# Patient Record
Sex: MALE | Race: WHITE | NOT HISPANIC OR LATINO | Employment: FULL TIME | URBAN - METROPOLITAN AREA
[De-identification: names, ages, dates, MRNs, and addresses within clinical notes are randomized per-mention and may not be internally consistent; named-entity substitution may affect disease eponyms.]

---

## 2017-03-11 ENCOUNTER — OFFICE VISIT (OUTPATIENT)
Dept: OPHTHALMOLOGY | Facility: CLINIC | Age: 52
End: 2017-03-11
Payer: COMMERCIAL

## 2017-03-11 DIAGNOSIS — H52.7 REFRACTIVE ERROR: ICD-10-CM

## 2017-03-11 DIAGNOSIS — Z13.5 GLAUCOMA SCREENING: ICD-10-CM

## 2017-03-11 DIAGNOSIS — Z01.00 VISIT FOR EYE AND VISION EXAM: Primary | ICD-10-CM

## 2017-03-11 PROCEDURE — 92014 COMPRE OPH EXAM EST PT 1/>: CPT | Mod: S$GLB,,, | Performed by: OPTOMETRIST

## 2017-03-11 PROCEDURE — 92015 DETERMINE REFRACTIVE STATE: CPT | Mod: S$GLB,,, | Performed by: OPTOMETRIST

## 2017-03-11 PROCEDURE — 99999 PR PBB SHADOW E&M-EST. PATIENT-LVL I: CPT | Mod: PBBFAC,,, | Performed by: OPTOMETRIST

## 2017-03-11 RX ORDER — HYDROCODONE BITARTRATE AND ACETAMINOPHEN 5; 325 MG/1; MG/1
TABLET ORAL
Refills: 0 | COMMUNITY
Start: 2017-02-20 | End: 2017-03-11

## 2017-03-11 NOTE — PROGRESS NOTES
HPI     Eye Exam    Additional comments: yearly           Blurred Vision    Additional comments: near           Eye Strain    Additional comments: fatigue           Comments   Pt's last eye exam was 9/26/14 with trf. First time seeing [facility]. Uses   +1.50-+2.00 otc readers. Interested in trying progressive lenses. States   possible change in va. C/o eye fatigue. Not using any gtts.        Last edited by Myriam Rodriguez on 3/11/2017  8:52 AM. (History)            Assessment /Plan     For exam results, see Encounter Report.    Visit for eye and vision exam    Glaucoma screening    Refractive error      OH OK OU.  Spec (PALS) Rx given.  RTC one year.

## 2019-11-21 DIAGNOSIS — Z00.00 ROUTINE GENERAL MEDICAL EXAMINATION AT A HEALTH CARE FACILITY: Primary | ICD-10-CM

## 2019-11-26 ENCOUNTER — CLINICAL SUPPORT (OUTPATIENT)
Dept: PULMONOLOGY | Facility: CLINIC | Age: 54
End: 2019-11-26

## 2019-11-26 ENCOUNTER — CLINICAL SUPPORT (OUTPATIENT)
Dept: CARDIOLOGY | Facility: CLINIC | Age: 54
End: 2019-11-26

## 2019-11-26 ENCOUNTER — HOSPITAL ENCOUNTER (OUTPATIENT)
Dept: RADIOLOGY | Facility: HOSPITAL | Age: 54
Discharge: HOME | End: 2019-11-26
Attending: INTERNAL MEDICINE

## 2019-11-26 ENCOUNTER — CLINICAL SUPPORT (OUTPATIENT)
Dept: INTERNAL MEDICINE | Facility: CLINIC | Age: 54
End: 2019-11-26

## 2019-11-26 ENCOUNTER — CLINICAL SUPPORT (OUTPATIENT)
Dept: AUDIOLOGY | Facility: CLINIC | Age: 54
End: 2019-11-26

## 2019-11-26 ENCOUNTER — OFFICE VISIT (OUTPATIENT)
Dept: INTERNAL MEDICINE | Facility: CLINIC | Age: 54
End: 2019-11-26

## 2019-11-26 VITALS
HEART RATE: 74 BPM | HEIGHT: 69 IN | RESPIRATION RATE: 16 BRPM | DIASTOLIC BLOOD PRESSURE: 78 MMHG | WEIGHT: 187.38 LBS | TEMPERATURE: 97 F | BODY MASS INDEX: 27.75 KG/M2 | SYSTOLIC BLOOD PRESSURE: 120 MMHG

## 2019-11-26 DIAGNOSIS — Z00.00 ROUTINE GENERAL MEDICAL EXAMINATION AT A HEALTH CARE FACILITY: ICD-10-CM

## 2019-11-26 DIAGNOSIS — Z00.00 ROUTINE GENERAL MEDICAL EXAMINATION AT A HEALTH CARE FACILITY: Primary | ICD-10-CM

## 2019-11-26 DIAGNOSIS — Z71.3 DIETARY COUNSELING: ICD-10-CM

## 2019-11-26 DIAGNOSIS — Z01.12 HEARING CONSERVATION AND TREATMENT EXAM: Primary | ICD-10-CM

## 2019-11-26 DIAGNOSIS — Z00.00 ROUTINE GENERAL MEDICAL EXAMINATION AT HEALTH CARE FACILITY: Primary | ICD-10-CM

## 2019-11-26 DIAGNOSIS — I10 HYPERTENSION, UNSPECIFIED TYPE: ICD-10-CM

## 2019-11-26 LAB
ALBUMIN SERPL BCP-MCNC: 3.8 G/DL (ref 3.5–5.2)
ALP SERPL-CCNC: 64 U/L (ref 55–135)
ALT SERPL W/O P-5'-P-CCNC: 13 U/L (ref 10–44)
ANION GAP SERPL CALC-SCNC: 9 MMOL/L (ref 8–16)
AST SERPL-CCNC: 18 U/L (ref 10–40)
BILIRUB SERPL-MCNC: 0.6 MG/DL (ref 0.1–1)
BILIRUB UR QL STRIP: NEGATIVE
BRPFT: NORMAL
BUN SERPL-MCNC: 15 MG/DL (ref 6–20)
CALCIUM SERPL-MCNC: 9.9 MG/DL (ref 8.7–10.5)
CHLORIDE SERPL-SCNC: 106 MMOL/L (ref 95–110)
CHOLEST SERPL-MCNC: 204 MG/DL (ref 120–199)
CHOLEST/HDLC SERPL: 6 {RATIO} (ref 2–5)
CLARITY UR: CLEAR
CO2 SERPL-SCNC: 23 MMOL/L (ref 23–29)
COLOR UR: YELLOW
COMPLEXED PSA SERPL-MCNC: 0.7 NG/ML (ref 0–4)
CREAT SERPL-MCNC: 1.3 MG/DL (ref 0.5–1.4)
ERYTHROCYTE [DISTWIDTH] IN BLOOD BY AUTOMATED COUNT: 11.7 % (ref 11.5–14.5)
EST. GFR  (AFRICAN AMERICAN): >60 ML/MIN/1.73 M^2
EST. GFR  (NON AFRICAN AMERICAN): >60 ML/MIN/1.73 M^2
ESTIMATED AVG GLUCOSE: 103 MG/DL (ref 68–131)
FEF 25 75 LLN: 1.59
FEF 25 75 PRE REF: 86.5 %
FEF 25 75 REF: 3.12
FEV1 FVC LLN: 67
FEV1 FVC PRE REF: 96.9 %
FEV1 FVC REF: 78
FEV1 LLN: 2.74
FEV1 PRE REF: 92.2 %
FEV1 REF: 3.59
FVC LLN: 3.54
FVC PRE REF: 94.9 %
FVC REF: 4.6
GLUCOSE SERPL-MCNC: 87 MG/DL (ref 70–110)
GLUCOSE UR QL STRIP: NEGATIVE
HBA1C MFR BLD HPLC: 5.2 % (ref 4–5.6)
HCT VFR BLD AUTO: 45.9 % (ref 40–54)
HDLC SERPL-MCNC: 34 MG/DL (ref 40–75)
HDLC SERPL: 16.7 % (ref 20–50)
HGB BLD-MCNC: 15.6 G/DL (ref 14–18)
HGB UR QL STRIP: NEGATIVE
KETONES UR QL STRIP: NEGATIVE
LDLC SERPL CALC-MCNC: 144.2 MG/DL (ref 63–159)
LEUKOCYTE ESTERASE UR QL STRIP: NEGATIVE
MCH RBC QN AUTO: 31.6 PG (ref 27–31)
MCHC RBC AUTO-ENTMCNC: 34 G/DL (ref 32–36)
MCV RBC AUTO: 93 FL (ref 82–98)
NITRITE UR QL STRIP: NEGATIVE
NONHDLC SERPL-MCNC: 170 MG/DL
PEF LLN: 7.01
PEF PRE REF: 96.3 %
PEF REF: 9.25
PH UR STRIP: 6 [PH] (ref 5–8)
PLATELET # BLD AUTO: 209 K/UL (ref 150–350)
PMV BLD AUTO: 10.2 FL (ref 9.2–12.9)
POTASSIUM SERPL-SCNC: 4.1 MMOL/L (ref 3.5–5.1)
PRE FEF 25 75: 2.7 L/S (ref 1.59–4.65)
PRE FET 100: 11.42 SEC
PRE FEV1 FVC: 75.84 % (ref 66.66–89.85)
PRE FEV1: 3.31 L (ref 2.74–4.43)
PRE FVC: 4.36 L (ref 3.54–5.65)
PRE PEF: 8.91 L/S (ref 7.01–11.49)
PROT SERPL-MCNC: 7.5 G/DL (ref 6–8.4)
PROT UR QL STRIP: NEGATIVE
RBC # BLD AUTO: 4.94 M/UL (ref 4.6–6.2)
SODIUM SERPL-SCNC: 138 MMOL/L (ref 136–145)
SP GR UR STRIP: 1.01 (ref 1–1.03)
TRIGL SERPL-MCNC: 129 MG/DL (ref 30–150)
TSH SERPL DL<=0.005 MIU/L-ACNC: 3.42 UIU/ML (ref 0.4–4)
URN SPEC COLLECT METH UR: NORMAL
WBC # BLD AUTO: 6.83 K/UL (ref 3.9–12.7)

## 2019-11-26 PROCEDURE — 71046 X-RAY EXAM CHEST 2 VIEWS: CPT | Mod: TC

## 2019-11-26 PROCEDURE — 85027 COMPLETE CBC AUTOMATED: CPT

## 2019-11-26 PROCEDURE — 94010 BREATHING CAPACITY TEST: ICD-10-PCS | Mod: S$GLB,,, | Performed by: INTERNAL MEDICINE

## 2019-11-26 PROCEDURE — 99999 PR PBB SHADOW E&M-EST. PATIENT-LVL III: CPT | Mod: PBBFAC,,, | Performed by: INTERNAL MEDICINE

## 2019-11-26 PROCEDURE — 99999 PR PBB SHADOW E&M-EST. PATIENT-LVL III: ICD-10-PCS | Mod: PBBFAC,,, | Performed by: INTERNAL MEDICINE

## 2019-11-26 PROCEDURE — 92552 PURE TONE AUDIOMETRY AIR: CPT | Mod: S$GLB,,, | Performed by: AUDIOLOGIST-HEARING AID FITTER

## 2019-11-26 PROCEDURE — 97802 MEDICAL NUTRITION INDIV IN: CPT | Mod: S$GLB,,, | Performed by: INTERNAL MEDICINE

## 2019-11-26 PROCEDURE — 80053 COMPREHEN METABOLIC PANEL: CPT

## 2019-11-26 PROCEDURE — 93010 EKG 12-LEAD: ICD-10-PCS | Mod: S$GLB,,, | Performed by: INTERNAL MEDICINE

## 2019-11-26 PROCEDURE — 80061 LIPID PANEL: CPT

## 2019-11-26 PROCEDURE — 84153 ASSAY OF PSA TOTAL: CPT

## 2019-11-26 PROCEDURE — 86803 HEPATITIS C AB TEST: CPT

## 2019-11-26 PROCEDURE — 94010 BREATHING CAPACITY TEST: CPT | Mod: S$GLB,,, | Performed by: INTERNAL MEDICINE

## 2019-11-26 PROCEDURE — 83036 HEMOGLOBIN GLYCOSYLATED A1C: CPT

## 2019-11-26 PROCEDURE — 97802 PR MED NUTR THER, 1ST, INDIV, EA 15 MIN: ICD-10-PCS | Mod: S$GLB,,, | Performed by: INTERNAL MEDICINE

## 2019-11-26 PROCEDURE — 99386 PREV VISIT NEW AGE 40-64: CPT | Mod: S$GLB,,, | Performed by: INTERNAL MEDICINE

## 2019-11-26 PROCEDURE — 93010 ELECTROCARDIOGRAM REPORT: CPT | Mod: S$GLB,,, | Performed by: INTERNAL MEDICINE

## 2019-11-26 PROCEDURE — 93005 EKG 12-LEAD: ICD-10-PCS | Mod: S$GLB,,, | Performed by: INTERNAL MEDICINE

## 2019-11-26 PROCEDURE — 93005 ELECTROCARDIOGRAM TRACING: CPT | Mod: S$GLB,,, | Performed by: INTERNAL MEDICINE

## 2019-11-26 PROCEDURE — 83655 ASSAY OF LEAD: CPT

## 2019-11-26 PROCEDURE — 99386 PR PREVENTIVE VISIT,NEW,40-64: ICD-10-PCS | Mod: S$GLB,,, | Performed by: INTERNAL MEDICINE

## 2019-11-26 PROCEDURE — 71046 XR CHEST PA AND LATERAL: ICD-10-PCS | Mod: 26,,, | Performed by: RADIOLOGY

## 2019-11-26 PROCEDURE — 71046 X-RAY EXAM CHEST 2 VIEWS: CPT | Mod: 26,,, | Performed by: RADIOLOGY

## 2019-11-26 PROCEDURE — 81003 URINALYSIS AUTO W/O SCOPE: CPT

## 2019-11-26 PROCEDURE — 84443 ASSAY THYROID STIM HORMONE: CPT

## 2019-11-26 PROCEDURE — 86703 HIV-1/HIV-2 1 RESULT ANTBDY: CPT

## 2019-11-26 PROCEDURE — 92552 PR PURE TONE AUDIOMETRY, AIR: ICD-10-PCS | Mod: S$GLB,,, | Performed by: AUDIOLOGIST-HEARING AID FITTER

## 2019-11-26 RX ORDER — LISINOPRIL 10 MG/1
5 TABLET ORAL DAILY
Refills: 3 | COMMUNITY
Start: 2019-08-22

## 2019-11-26 NOTE — PROGRESS NOTES
"Nutrition Assessment  Client name:  Kael Estrella III  :  1965  Age:  54 y.o.  Gender:  male    Client states:  Very pleasant employee of [employer] here for Executive Health physical. He works as a , day shift Monday-Friday.  30 years with 4 adult children. No problem list on file but patient does report a history of hypertension, well controlled with medication. No tobacco history, occasional alcohol consumption such as wine with dinner. He states that he eats to control hunger and that food is not the center of his world. Food recall includes 3 meals per day, minimal snacking, sugar sweetened beverages and daily dining out for lunch. Does not formally exercise, yet remains active walking during the workday. He states that he would like to lose about 10 lbs but feels good and does not currently see significant benefit in making any changes regarding health or weight management. Overall, he has no specific nutrition-related questions or concerns at this time.    Anthropometrics  Height:  5'9"    Weight:  187 lb  BMI:  27.67  % Body Fat:  NA    Clinical Signs/Symptoms  N/V/D:  None  Appetite (Good, Fair, or Poor):  Good      No past medical history on file.    No past surgical history on file.    Medications    currently has no medications in their medication list.    Vitamins, Minerals, and/or Supplements:  None     Food/Medication Interactions:  Reviewed     Food Allergies or Intolerances:  NKFA     Social History    Marital status:    Employment:  [employer]    Social History     Tobacco Use    Smoking status: Never Smoker   Substance Use Topics    Alcohol use: No        Lab Reports   Total Cholesterol:  204    Triglycerides:  129  HDL:  34  LDL:  144.2   Glucose:  87  HbA1c:  Pending  BP:  120/78     Food History  Breakfast:  Hot tea with cream and sugar, bagel or kolache  Mid-morning Snack:  None  Lunch:  Canes naked tenders, fries, sweet tea  Mid-afternoon " Snack:  None  Dinner:  Hamburger or pork chop with asparagus and sweet potato  H.S. Snack:  None  *Fluid intake:  Sweet tea, hot tea with sugar    Exercise History:  Not currently exercising    Cultural/Spiritual/Personal Preferences:  None Identified    Support System:  Spouse    State of Change:  Precontemplation    Barriers to Change:  Lack of perceived value of lifestyle change    Diagnosis    Overweight related to excessive energy intake and inadequate physical activity as evidenced by patient-reported diet and physical activity history and BMI 27.67.    Intervention    RMR (Method:  [place] - [place] Jeor):  1674 kcal  Activity Factor:  1.3  AYLA:  2176 - 300 = 1876    Goals:  1.  Gradually decrease sweet tea intake by diluting 1/2 with unsweet tea or replacing with water  2.  Increase vegetable intake to 1/2 plate with lunch and dinner  3.  Begin exercising, aiming for 150 minutes per week as tolerated    Nutrition Education  Lab results were not available at time of consult, therefore were not discussed. Discussed health benefits of achieving and maintaining a healthy weight through improving overall quality of diet and increasing physical activity. Discussed energy balance, meal planning and portion control using The Plate Method. Reviewed and provided resources to assist patient with selecting healthy options when dining out, encouraging patient to consider bringing lunch from home when possible to decrease reliance on fast food. Discussed sugar sweetened beverages and suggested strategies to decrease liquid calorie intake using portion control, adding water or using non-nutritive sweeteners. Reviewed ACSM's physical activity guidelines encouraging 150 minutes exercise weekly as tolerated. Encouraged patient to focus on one behavior modification at a time to build new, sustainable habits to improve long-term adherence.      Patient verbalized understanding of nutrition education and recommendations  received.    Handouts Provided  Meal Planning Guide  Restaurant Guide  Eat Fit Shopping List  Eat Fit Layla  Fast Food Guide  The Plate Method    Monitoring/Evaluation    Monitor the following:  Weight  BMI  % Body Fat  Caloric intake  Labs:  CMP, Hgb A1c, Lipid Panel    Follow Up Plan:  Communication with referring healthcare provider is unnecessary at this time as patient presented as part of annual wellness exam.  However, will follow up with patient in 1-2 years.

## 2019-11-26 NOTE — PROGRESS NOTES
Executive Health Screening    Kael Estrella III was seen 11/26/2019 for an executive health hearing screen.      Cerumen impaction found in the right EAC.Could not visualize TM. Results reveal a normal to severe hearing loss 250-8000 Hz for the right ear, and  Normal to moderate hearing loss 250-8000 Hz for the left ear.     Recommendations:  1.Cerumen Removal AD by a physician after 2 weeks of debrox use.  2. Diagnostic audiogram.  3. Binaural hearing aids should be considered.     Patient was counseled on the above findings.

## 2019-11-26 NOTE — PROGRESS NOTES
Subjective:      Patient ID: Kael Estrella III is a 54 y.o. male.    Chief Complaint: Executive Health    HPI   53 yo with There is no problem list on file for this patient.    History reviewed. No pertinent past medical history.    Here today for annual exam    Pmh: htn    Meds: lisin    nkda    Psh: Hydrocele   Oral sx  Social History     Socioeconomic History    Marital status:      Spouse name: Not on file    Number of children: Not on file    Years of education: Not on file    Highest education level: Not on file   Occupational History    Not on file   Social Needs    Financial resource strain: Not on file    Food insecurity:     Worry: Not on file     Inability: Not on file    Transportation needs:     Medical: Not on file     Non-medical: Not on file   Tobacco Use    Smoking status: Never Smoker   Substance and Sexual Activity    Alcohol use: No    Drug use: Not on file    Sexual activity: Not on file   Lifestyle    Physical activity:     Days per week: Not on file     Minutes per session: Not on file    Stress: Not on file   Relationships    Social connections:     Talks on phone: Not on file     Gets together: Not on file     Attends Buddhist service: Not on file     Active member of club or organization: Not on file     Attends meetings of clubs or organizations: Not on file     Relationship status: Not on file   Other Topics Concern    Not on file   Social History Narrative    Not on file       rare etoh    Mother h/o ms  Dad pacemaker.   Siblings healthy.       Prevent: Check with your pharmacy regarding new shingles vaccine.   Colon--reports up to date with colanu this year.  Tetanus within last 10 years reported.   Flu vaccine declined      Review of Systems   Constitutional: Negative for chills and fever.   HENT: Negative for ear pain and sore throat.    Respiratory: Negative for cough.    Cardiovascular: Negative for chest pain.   Gastrointestinal: Negative for abdominal  "pain and blood in stool.   Genitourinary: Negative for dysuria and hematuria.   Neurological: Negative for seizures and syncope.     Objective:   /78   Pulse 74   Temp 96.5 °F (35.8 °C) (Tympanic)   Resp 16   Ht 5' 9" (1.753 m)   Wt 85 kg (187 lb 6.3 oz)   BMI 27.67 kg/m²     Physical Exam   Constitutional: He is oriented to person, place, and time. He appears well-developed and well-nourished. No distress.   HENT:   Head: Normocephalic and atraumatic.   Mouth/Throat: Oropharynx is clear and moist.   Eyes: Pupils are equal, round, and reactive to light. EOM are normal.   Neck: Neck supple. No thyromegaly present.   Cardiovascular: Normal rate and regular rhythm.   Pulmonary/Chest: Breath sounds normal. He has no wheezes. He has no rales.   Abdominal: Soft. Bowel sounds are normal. There is no tenderness.   Lymphadenopathy:     He has no cervical adenopathy.   Neurological: He is alert and oriented to person, place, and time.   Skin: Skin is warm and dry.   Psychiatric: He has a normal mood and affect. His behavior is normal.     ascvd 9%    Assessment:     1. Routine general medical examination at a health care facility    2. Hypertension, unspecified type      Plan:   Routine general medical examination at a health care facility    Hypertension, unspecified type      Heart healthy diet and reg exercise   reviewed  Elevated ascvd of 9%- discuss further with pcp.       Lab Frequency Next Occurrence       Problem List Items Addressed This Visit     None      Visit Diagnoses     Routine general medical examination at a health care facility    -  Primary    Hypertension, unspecified type              Follow up if symptoms worsen or fail to improve.  "

## 2019-11-27 LAB
HCV AB SERPL QL IA: NEGATIVE
HIV 1+2 AB+HIV1 P24 AG SERPL QL IA: NEGATIVE

## 2019-11-29 LAB
CITY: NORMAL
COUNTY: NORMAL
GUARDIAN FIRST NAME: NORMAL
GUARDIAN LAST NAME: NORMAL
LEAD BLD-MCNC: <1 MCG/DL (ref 0–4.9)
PHONE #: NORMAL
POSTAL CODE: NORMAL
RACE: NORMAL
SPECIMEN SOURCE: NORMAL
STATE OF RESIDENCE: NORMAL
STREET ADDRESS: NORMAL

## 2020-02-24 ENCOUNTER — OFFICE VISIT (OUTPATIENT)
Dept: OPHTHALMOLOGY | Facility: CLINIC | Age: 55
End: 2020-02-24
Payer: COMMERCIAL

## 2020-02-24 DIAGNOSIS — H52.203 ASTIGMATISM OF BOTH EYES WITH PRESBYOPIA: ICD-10-CM

## 2020-02-24 DIAGNOSIS — Z01.00 ENCOUNTER FOR EXAMINATION OF EYES AND VISION WITHOUT ABNORMAL FINDINGS: Primary | ICD-10-CM

## 2020-02-24 DIAGNOSIS — Z13.5 GLAUCOMA SCREENING: ICD-10-CM

## 2020-02-24 DIAGNOSIS — H52.4 ASTIGMATISM OF BOTH EYES WITH PRESBYOPIA: ICD-10-CM

## 2020-02-24 PROCEDURE — 92015 PR REFRACTION: ICD-10-PCS | Mod: ,,, | Performed by: OPTOMETRIST

## 2020-02-24 PROCEDURE — 99999 PR PBB SHADOW E&M-EST. PATIENT-LVL I: CPT | Mod: PBBFAC,,, | Performed by: OPTOMETRIST

## 2020-02-24 PROCEDURE — 92015 DETERMINE REFRACTIVE STATE: CPT | Mod: ,,, | Performed by: OPTOMETRIST

## 2020-02-24 PROCEDURE — 92012 PR EYE EXAM, EST PATIENT,INTERMED: ICD-10-PCS | Mod: ,,, | Performed by: OPTOMETRIST

## 2020-02-24 PROCEDURE — 92012 INTRM OPH EXAM EST PATIENT: CPT | Mod: ,,, | Performed by: OPTOMETRIST

## 2020-02-24 PROCEDURE — 99999 PR PBB SHADOW E&M-EST. PATIENT-LVL I: ICD-10-PCS | Mod: PBBFAC,,, | Performed by: OPTOMETRIST

## 2020-02-24 NOTE — PROGRESS NOTES
HPI     HPI    Any vision changes since last exam: blurred a little.  Wears glasses full   time. Feels that they improve his distance vision.   Eye pain: no  CC-interested in contacts.  Fire investigation/inspection officer.    Glasses get very dirty when on an investigation site.              Last edited by Shayla Robertson, OD on 2/24/2020  2:12 PM. (History)            Assessment /Plan     For exam results, see Encounter Report.    Encounter for examination of eyes and vision without abnormal findings    Glaucoma screening    Astigmatism of both eyes with presbyopia      Glaucoma screening negative and normal slit lamp exam.    Updated glasses prescription - increased astigmatism.  Contact lens options aren't great for astigmatism and presbyopia.  Recommend against contacts.  Return to clinic 1 yr.

## 2020-05-13 ENCOUNTER — OFFICE VISIT (OUTPATIENT)
Dept: OPHTHALMOLOGY | Facility: CLINIC | Age: 55
End: 2020-05-13

## 2020-05-13 DIAGNOSIS — H52.203 ASTIGMATISM OF BOTH EYES WITH PRESBYOPIA: Primary | ICD-10-CM

## 2020-05-13 DIAGNOSIS — H52.4 ASTIGMATISM OF BOTH EYES WITH PRESBYOPIA: Primary | ICD-10-CM

## 2020-05-13 PROCEDURE — 99999 PR PBB SHADOW E&M-EST. PATIENT-LVL I: CPT | Mod: PBBFAC,,, | Performed by: OPTOMETRIST

## 2020-05-13 PROCEDURE — 99999 PR PBB SHADOW E&M-EST. PATIENT-LVL I: ICD-10-PCS | Mod: PBBFAC,,, | Performed by: OPTOMETRIST

## 2020-05-13 PROCEDURE — 99499 UNLISTED E&M SERVICE: CPT | Mod: S$GLB,,, | Performed by: OPTOMETRIST

## 2020-05-13 PROCEDURE — 99499 NO LOS: ICD-10-PCS | Mod: S$GLB,,, | Performed by: OPTOMETRIST

## 2020-05-13 NOTE — PROGRESS NOTES
HPI     PT was last seen on 2/24/20 with [facility]. PT filled recent Rx at [facility] about 6 weeks ago.  PT states he cannot read in new gls and has problems with the peripheral   vision.  Sees better in previous Rx    Last edited by Kamille Akers MA on 5/13/2020 10:50 AM. (History)            Assessment /Plan     For exam results, see Encounter Report.    Astigmatism of both eyes with presbyopia      Eyeglass Final Rx     Eyeglass Final Rx       Sphere Cylinder Axis Add    Right [place] +0.75 035 +2.25    Left +0.50   +2.25    Type:  PAL    Expiration Date:  5/14/2021              Latent hyperope with lots of with motion during ret but does not accept plus    Recommend remake with digital PAL    RTC PRN

## 2021-01-05 ENCOUNTER — CLINICAL SUPPORT (OUTPATIENT)
Dept: INTERNAL MEDICINE | Facility: CLINIC | Age: 56
End: 2021-01-05

## 2021-01-05 ENCOUNTER — OFFICE VISIT (OUTPATIENT)
Dept: INTERNAL MEDICINE | Facility: CLINIC | Age: 56
End: 2021-01-05
Payer: COMMERCIAL

## 2021-01-05 ENCOUNTER — CLINICAL SUPPORT (OUTPATIENT)
Dept: AUDIOLOGY | Facility: CLINIC | Age: 56
End: 2021-01-05
Payer: COMMERCIAL

## 2021-01-05 ENCOUNTER — CLINICAL SUPPORT (OUTPATIENT)
Dept: INTERNAL MEDICINE | Facility: CLINIC | Age: 56
End: 2021-01-05
Payer: COMMERCIAL

## 2021-01-05 VITALS
DIASTOLIC BLOOD PRESSURE: 83 MMHG | OXYGEN SATURATION: 97 % | SYSTOLIC BLOOD PRESSURE: 116 MMHG | TEMPERATURE: 98 F | WEIGHT: 189.63 LBS | BODY MASS INDEX: 28 KG/M2 | HEART RATE: 84 BPM

## 2021-01-05 DIAGNOSIS — Z01.12 HEARING CONSERVATION AND TREATMENT EXAM: Primary | ICD-10-CM

## 2021-01-05 DIAGNOSIS — Z00.00 ROUTINE GENERAL MEDICAL EXAMINATION AT A HEALTH CARE FACILITY: Primary | ICD-10-CM

## 2021-01-05 DIAGNOSIS — I10 ESSENTIAL HYPERTENSION: ICD-10-CM

## 2021-01-05 DIAGNOSIS — Z71.3 DIETARY COUNSELING: Primary | ICD-10-CM

## 2021-01-05 LAB
ALBUMIN SERPL BCP-MCNC: 3.6 G/DL (ref 3.5–5.2)
ALP SERPL-CCNC: 74 U/L (ref 55–135)
ALT SERPL W/O P-5'-P-CCNC: 38 U/L (ref 10–44)
ANION GAP SERPL CALC-SCNC: 8 MMOL/L (ref 8–16)
AST SERPL-CCNC: 23 U/L (ref 10–40)
BILIRUB SERPL-MCNC: 0.7 MG/DL (ref 0.1–1)
BILIRUB UR QL STRIP: NEGATIVE
BUN SERPL-MCNC: 12 MG/DL (ref 6–20)
CALCIUM SERPL-MCNC: 9.3 MG/DL (ref 8.7–10.5)
CHLORIDE SERPL-SCNC: 105 MMOL/L (ref 95–110)
CHOLEST SERPL-MCNC: 201 MG/DL (ref 120–199)
CHOLEST/HDLC SERPL: 6.1 {RATIO} (ref 2–5)
CLARITY UR: CLEAR
CO2 SERPL-SCNC: 25 MMOL/L (ref 23–29)
COLOR UR: YELLOW
COMPLEXED PSA SERPL-MCNC: 1.9 NG/ML (ref 0–4)
CREAT SERPL-MCNC: 1.4 MG/DL (ref 0.5–1.4)
ERYTHROCYTE [DISTWIDTH] IN BLOOD BY AUTOMATED COUNT: 11.6 % (ref 11.5–14.5)
EST. GFR  (AFRICAN AMERICAN): >60 ML/MIN/1.73 M^2
EST. GFR  (NON AFRICAN AMERICAN): 55 ML/MIN/1.73 M^2
ESTIMATED AVG GLUCOSE: 105 MG/DL (ref 68–131)
GLUCOSE SERPL-MCNC: 90 MG/DL (ref 70–110)
GLUCOSE UR QL STRIP: NEGATIVE
HBA1C MFR BLD HPLC: 5.3 % (ref 4–5.6)
HCT VFR BLD AUTO: 43.1 % (ref 40–54)
HDLC SERPL-MCNC: 33 MG/DL (ref 40–75)
HDLC SERPL: 16.4 % (ref 20–50)
HGB BLD-MCNC: 14.8 G/DL (ref 14–18)
HGB UR QL STRIP: NEGATIVE
KETONES UR QL STRIP: NEGATIVE
LDLC SERPL CALC-MCNC: 135 MG/DL (ref 63–159)
LEUKOCYTE ESTERASE UR QL STRIP: NEGATIVE
MCH RBC QN AUTO: 32.2 PG (ref 27–31)
MCHC RBC AUTO-ENTMCNC: 34.3 G/DL (ref 32–36)
MCV RBC AUTO: 94 FL (ref 82–98)
NITRITE UR QL STRIP: NEGATIVE
NONHDLC SERPL-MCNC: 168 MG/DL
PH UR STRIP: 6 [PH] (ref 5–8)
PLATELET # BLD AUTO: 194 K/UL (ref 150–350)
PMV BLD AUTO: 9.6 FL (ref 9.2–12.9)
POTASSIUM SERPL-SCNC: 4.3 MMOL/L (ref 3.5–5.1)
PROT SERPL-MCNC: 7.5 G/DL (ref 6–8.4)
PROT UR QL STRIP: NEGATIVE
RBC # BLD AUTO: 4.59 M/UL (ref 4.6–6.2)
SODIUM SERPL-SCNC: 138 MMOL/L (ref 136–145)
SP GR UR STRIP: 1.02 (ref 1–1.03)
TRIGL SERPL-MCNC: 165 MG/DL (ref 30–150)
TSH SERPL DL<=0.005 MIU/L-ACNC: 1.82 UIU/ML (ref 0.4–4)
URN SPEC COLLECT METH UR: NORMAL
WBC # BLD AUTO: 6.37 K/UL (ref 3.9–12.7)

## 2021-01-05 PROCEDURE — 99999 PR PBB SHADOW E&M-EST. PATIENT-LVL III: CPT | Mod: PBBFAC,,, | Performed by: INTERNAL MEDICINE

## 2021-01-05 PROCEDURE — 80061 LIPID PANEL: CPT

## 2021-01-05 PROCEDURE — 97802 MEDICAL NUTRITION INDIV IN: CPT | Mod: S$GLB,,, | Performed by: DIETITIAN, REGISTERED

## 2021-01-05 PROCEDURE — 84443 ASSAY THYROID STIM HORMONE: CPT

## 2021-01-05 PROCEDURE — 85027 COMPLETE CBC AUTOMATED: CPT

## 2021-01-05 PROCEDURE — 99396 PREV VISIT EST AGE 40-64: CPT | Mod: S$GLB,,, | Performed by: INTERNAL MEDICINE

## 2021-01-05 PROCEDURE — 80053 COMPREHEN METABOLIC PANEL: CPT

## 2021-01-05 PROCEDURE — 97802 PR MED NUTR THER, 1ST, INDIV, EA 15 MIN: ICD-10-PCS | Mod: S$GLB,,, | Performed by: DIETITIAN, REGISTERED

## 2021-01-05 PROCEDURE — 99396 PR PREVENTIVE VISIT,EST,40-64: ICD-10-PCS | Mod: S$GLB,,, | Performed by: INTERNAL MEDICINE

## 2021-01-05 PROCEDURE — 81003 URINALYSIS AUTO W/O SCOPE: CPT

## 2021-01-05 PROCEDURE — 92552 PURE TONE AUDIOMETRY AIR: CPT | Mod: S$GLB,,, | Performed by: AUDIOLOGIST-HEARING AID FITTER

## 2021-01-05 PROCEDURE — 99999 PR PBB SHADOW E&M-EST. PATIENT-LVL III: ICD-10-PCS | Mod: PBBFAC,,, | Performed by: INTERNAL MEDICINE

## 2021-01-05 PROCEDURE — 83036 HEMOGLOBIN GLYCOSYLATED A1C: CPT

## 2021-01-05 PROCEDURE — 83655 ASSAY OF LEAD: CPT

## 2021-01-05 PROCEDURE — 92552 PR PURE TONE AUDIOMETRY, AIR: ICD-10-PCS | Mod: S$GLB,,, | Performed by: AUDIOLOGIST-HEARING AID FITTER

## 2021-01-05 PROCEDURE — 84153 ASSAY OF PSA TOTAL: CPT

## 2021-01-06 LAB
LEAD BLD-MCNC: 1.7 MCG/DL
SPECIMEN SOURCE: NORMAL
STATE OF RESIDENCE: NORMAL

## 2021-08-24 ENCOUNTER — TELEPHONE (OUTPATIENT)
Dept: INTERNAL MEDICINE | Facility: CLINIC | Age: 56
End: 2021-08-24

## 2022-12-07 ENCOUNTER — CLINICAL SUPPORT (OUTPATIENT)
Dept: INTERNAL MEDICINE | Facility: CLINIC | Age: 57
End: 2022-12-07

## 2022-12-07 ENCOUNTER — OFFICE VISIT (OUTPATIENT)
Dept: INTERNAL MEDICINE | Facility: CLINIC | Age: 57
End: 2022-12-07

## 2022-12-07 ENCOUNTER — CLINICAL SUPPORT (OUTPATIENT)
Dept: AUDIOLOGY | Facility: CLINIC | Age: 57
End: 2022-12-07

## 2022-12-07 ENCOUNTER — CLINICAL SUPPORT (OUTPATIENT)
Dept: INTERNAL MEDICINE | Facility: CLINIC | Age: 57
End: 2022-12-07
Payer: COMMERCIAL

## 2022-12-07 VITALS
HEIGHT: 69 IN | WEIGHT: 180.75 LBS | SYSTOLIC BLOOD PRESSURE: 103 MMHG | BODY MASS INDEX: 26.77 KG/M2 | HEART RATE: 72 BPM | TEMPERATURE: 97 F | DIASTOLIC BLOOD PRESSURE: 71 MMHG

## 2022-12-07 DIAGNOSIS — Z00.00 ROUTINE GENERAL MEDICAL EXAMINATION AT A HEALTH CARE FACILITY: Primary | ICD-10-CM

## 2022-12-07 DIAGNOSIS — Z71.3 DIETARY COUNSELING: Primary | ICD-10-CM

## 2022-12-07 DIAGNOSIS — I10 ESSENTIAL HYPERTENSION: ICD-10-CM

## 2022-12-07 DIAGNOSIS — Z01.12 HEARING CONSERVATION AND TREATMENT EXAM: Primary | ICD-10-CM

## 2022-12-07 LAB
ALBUMIN SERPL BCP-MCNC: 4 G/DL (ref 3.5–5.2)
ALP SERPL-CCNC: 59 U/L (ref 55–135)
ALT SERPL W/O P-5'-P-CCNC: 19 U/L (ref 10–44)
ANION GAP SERPL CALC-SCNC: 9 MMOL/L (ref 8–16)
AST SERPL-CCNC: 19 U/L (ref 10–40)
BILIRUB SERPL-MCNC: 0.9 MG/DL (ref 0.1–1)
BILIRUB UR QL STRIP: NEGATIVE
BUN SERPL-MCNC: 18 MG/DL (ref 6–20)
CALCIUM SERPL-MCNC: 9.7 MG/DL (ref 8.7–10.5)
CHLORIDE SERPL-SCNC: 106 MMOL/L (ref 95–110)
CHOLEST SERPL-MCNC: 185 MG/DL (ref 120–199)
CHOLEST/HDLC SERPL: 4.9 {RATIO} (ref 2–5)
CLARITY UR: CLEAR
CO2 SERPL-SCNC: 25 MMOL/L (ref 23–29)
COLOR UR: YELLOW
COMPLEXED PSA SERPL-MCNC: 0.96 NG/ML (ref 0–4)
CREAT SERPL-MCNC: 1.4 MG/DL (ref 0.5–1.4)
ERYTHROCYTE [DISTWIDTH] IN BLOOD BY AUTOMATED COUNT: 11.7 % (ref 11.5–14.5)
EST. GFR  (NO RACE VARIABLE): 58 ML/MIN/1.73 M^2
ESTIMATED AVG GLUCOSE: 94 MG/DL (ref 68–131)
GLUCOSE SERPL-MCNC: 86 MG/DL (ref 70–110)
GLUCOSE UR QL STRIP: NEGATIVE
HBA1C MFR BLD: 4.9 % (ref 4–5.6)
HCT VFR BLD AUTO: 43 % (ref 40–54)
HDLC SERPL-MCNC: 38 MG/DL (ref 40–75)
HDLC SERPL: 20.5 % (ref 20–50)
HGB BLD-MCNC: 15.3 G/DL (ref 14–18)
HGB UR QL STRIP: NEGATIVE
KETONES UR QL STRIP: NEGATIVE
LDLC SERPL CALC-MCNC: 131 MG/DL (ref 63–159)
LEUKOCYTE ESTERASE UR QL STRIP: NEGATIVE
MCH RBC QN AUTO: 33 PG (ref 27–31)
MCHC RBC AUTO-ENTMCNC: 35.6 G/DL (ref 32–36)
MCV RBC AUTO: 93 FL (ref 82–98)
NITRITE UR QL STRIP: NEGATIVE
NONHDLC SERPL-MCNC: 147 MG/DL
PH UR STRIP: 6 [PH] (ref 5–8)
PLATELET # BLD AUTO: 225 K/UL (ref 150–450)
PMV BLD AUTO: 10 FL (ref 9.2–12.9)
POTASSIUM SERPL-SCNC: 4.5 MMOL/L (ref 3.5–5.1)
PROT SERPL-MCNC: 7.2 G/DL (ref 6–8.4)
PROT UR QL STRIP: NEGATIVE
RBC # BLD AUTO: 4.64 M/UL (ref 4.6–6.2)
SODIUM SERPL-SCNC: 140 MMOL/L (ref 136–145)
SP GR UR STRIP: 1.02 (ref 1–1.03)
TRIGL SERPL-MCNC: 80 MG/DL (ref 30–150)
TSH SERPL DL<=0.005 MIU/L-ACNC: 2.66 UIU/ML (ref 0.4–4)
URN SPEC COLLECT METH UR: NORMAL
WBC # BLD AUTO: 7.59 K/UL (ref 3.9–12.7)

## 2022-12-07 PROCEDURE — 97802 MEDICAL NUTRITION INDIV IN: CPT | Mod: S$GLB,,, | Performed by: INTERNAL MEDICINE

## 2022-12-07 PROCEDURE — 92552 PURE TONE AUDIOMETRY AIR: CPT | Mod: S$GLB,,,

## 2022-12-07 PROCEDURE — 81003 URINALYSIS AUTO W/O SCOPE: CPT | Performed by: INTERNAL MEDICINE

## 2022-12-07 PROCEDURE — 80053 COMPREHEN METABOLIC PANEL: CPT | Performed by: INTERNAL MEDICINE

## 2022-12-07 PROCEDURE — 85027 COMPLETE CBC AUTOMATED: CPT | Performed by: INTERNAL MEDICINE

## 2022-12-07 PROCEDURE — 99999 PR PBB SHADOW E&M-EST. PATIENT-LVL I: CPT | Mod: PBBFAC,,,

## 2022-12-07 PROCEDURE — 99999 PR PBB SHADOW E&M-EST. PATIENT-LVL III: CPT | Mod: PBBFAC,,, | Performed by: INTERNAL MEDICINE

## 2022-12-07 PROCEDURE — 92552 PR PURE TONE AUDIOMETRY, AIR: ICD-10-PCS | Mod: S$GLB,,,

## 2022-12-07 PROCEDURE — 97802 PR MED NUTR THER, 1ST, INDIV, EA 15 MIN: ICD-10-PCS | Mod: S$GLB,,, | Performed by: INTERNAL MEDICINE

## 2022-12-07 PROCEDURE — 84153 ASSAY OF PSA TOTAL: CPT | Performed by: INTERNAL MEDICINE

## 2022-12-07 PROCEDURE — 84443 ASSAY THYROID STIM HORMONE: CPT | Performed by: INTERNAL MEDICINE

## 2022-12-07 PROCEDURE — 99396 PREV VISIT EST AGE 40-64: CPT | Mod: S$GLB,,, | Performed by: INTERNAL MEDICINE

## 2022-12-07 PROCEDURE — 99999 PR PBB SHADOW E&M-EST. PATIENT-LVL I: ICD-10-PCS | Mod: PBBFAC,,,

## 2022-12-07 PROCEDURE — 99396 PR PREVENTIVE VISIT,EST,40-64: ICD-10-PCS | Mod: S$GLB,,, | Performed by: INTERNAL MEDICINE

## 2022-12-07 PROCEDURE — 99999 PR PBB SHADOW E&M-EST. PATIENT-LVL III: ICD-10-PCS | Mod: PBBFAC,,, | Performed by: INTERNAL MEDICINE

## 2022-12-07 PROCEDURE — 83036 HEMOGLOBIN GLYCOSYLATED A1C: CPT | Performed by: INTERNAL MEDICINE

## 2022-12-07 PROCEDURE — 83655 ASSAY OF LEAD: CPT | Performed by: INTERNAL MEDICINE

## 2022-12-07 PROCEDURE — 80061 LIPID PANEL: CPT | Performed by: INTERNAL MEDICINE

## 2022-12-07 NOTE — PROGRESS NOTES
Nutrition Assessment  Session Time:        Client name:  Kael Estrella III  :  1965  Age:  57 y.o.  Gender:  male    Client states:  [employer] employee present for annual physical.  Was last seen in 2021.  Reports losing approximately 10 lbs since last visit.  Satisfied with results but would like to lose a few more.   Reports main issue is lack of consistency with exercise.  Joined [employer] with wife recently but cannot establish a consistent routine due to lack of energy after work and not liking recent time change.   Despite lack of formal exercise, reports being active at work and on weekends with daily activities.   Since last physical has eliminated sodas from diet.  Daily drink choices include water and bodyarmor lyte.  Does not consume alcohol often.  Reports eating a relatively balanced diet on most days.    Aware of further nutrition changes that could be made in order to see more results.    Reports recent lipid panel take with PCP has shown improvement since last [facility] physical.    Does not have any questions today.  Not seeking any dietary guidance at the moment.       2021: Client states:  Very pleasant employee of [employer] here for [employer] physical. He works as a , day shift Monday-Friday.  31 years with 4 adult children. PMH of hypertension, on medication. Pt also reports having COVID-19 last month where he lost approximately 8 lbs due to loss of taste and smell.  Food recall includes 3 meals per day, minimal snacking, sugar sweetened beverages and frequent dining out for lunch. The evening meal is very light, sometimes only eating toast.  Does not formally exercise, yet remains active walking during the workday. He has been drinking less soda since having COVID-19 and would like to stop drinking it all together. He would like to maintain the 8 lb weight loss.          Anthropometrics  Height:   "69"     Weight:  180 lbs   1-5-2021: 189 lbs  BMI:  26.70  % Body Fat:  n/a    Clinical Signs/Symptoms  N/V/D:  none reported  Appetite:  good       Past Medical History:   Diagnosis Date    COVID-19 12/07/2020    Essential hypertension 1/5/2021       No past surgical history on file.    Medications    has a current medication list which includes the following prescription(s): lisinopril.    Vitamins, Minerals, and/or Supplements:  not discussed     Food/Medication Interactions:  Reviewed     Food Allergies or Intolerances:  NKFA     Social History    Marital status:    Employment:  [employer]    Social History     Tobacco Use    Smoking status: Never    Smokeless tobacco: Never   Substance Use Topics    Alcohol use: No        Lab Reports   Sodium   Date Value Ref Range Status   01/05/2021 138 136 - 145 mmol/L Final     Potassium   Date Value Ref Range Status   01/05/2021 4.3 3.5 - 5.1 mmol/L Final     Chloride   Date Value Ref Range Status   01/05/2021 105 95 - 110 mmol/L Final     CO2   Date Value Ref Range Status   01/05/2021 25 23 - 29 mmol/L Final     Glucose   Date Value Ref Range Status   01/05/2021 90 70 - 110 mg/dL Final     BUN   Date Value Ref Range Status   01/05/2021 12 6 - 20 mg/dL Final     Creatinine   Date Value Ref Range Status   01/05/2021 1.4 0.5 - 1.4 mg/dL Final     Calcium   Date Value Ref Range Status   01/05/2021 9.3 8.7 - 10.5 mg/dL Final     Total Protein   Date Value Ref Range Status   01/05/2021 7.5 6.0 - 8.4 g/dL Final     Albumin   Date Value Ref Range Status   01/05/2021 3.6 3.5 - 5.2 g/dL Final     Total Bilirubin   Date Value Ref Range Status   01/05/2021 0.7 0.1 - 1.0 mg/dL Final     Comment:     For infants and newborns, interpretation of results should be based  on gestational age, weight and in agreement with clinical  observations.  Premature Infant recommended reference ranges:  Up to 24 hours.............<8.0 mg/dL  Up to 48 hours............<12.0 mg/dL  3-5 " days..................<15.0 mg/dL  6-29 days.................<15.0 mg/dL       Alkaline Phosphatase   Date Value Ref Range Status   01/05/2021 74 55 - 135 U/L Final     AST   Date Value Ref Range Status   01/05/2021 23 10 - 40 U/L Final     ALT   Date Value Ref Range Status   01/05/2021 38 10 - 44 U/L Final     Anion Gap   Date Value Ref Range Status   01/05/2021 8 8 - 16 mmol/L Final     eGFR if    Date Value Ref Range Status   01/05/2021 >60 >60 mL/min/1.73 m^2 Final     eGFR if non    Date Value Ref Range Status   01/05/2021 55 (A) >60 mL/min/1.73 m^2 Final     Comment:     Calculation used to obtain the estimated glomerular filtration  rate (eGFR) is the CKD-EPI equation.         Lab Results   Component Value Date    WBC 6.37 01/05/2021    HGB 14.8 01/05/2021    HCT 43.1 01/05/2021    MCV 94 01/05/2021     01/05/2021        Lab Results   Component Value Date    CHOL 201 (H) 01/05/2021     Lab Results   Component Value Date    HDL 33 (L) 01/05/2021     Lab Results   Component Value Date    LDLCALC 135.0 01/05/2021     Lab Results   Component Value Date    TRIG 165 (H) 01/05/2021     Lab Results   Component Value Date    CHOLHDL 16.4 (L) 01/05/2021     Lab Results   Component Value Date    HGBA1C 5.3 01/05/2021     BP Readings from Last 1 Encounters:   01/05/21 116/83       Food History  No recall.     Exercise History:  inconsistent// active with work and daily activities    Cultural/Spiritual/Personal Preferences:  None identified    Support System:  spouse    State of Change:  Preparation    Barriers to Change:  none    Diagnosis    Other: overweight  related to excess energy intake  as evidenced by BMI 26.    Intervention    RMR (Method:  [place]. Jeor):  1627 kcal  Activity Factor:  1.3    AYLA:  2115 - 300 = 1815 kcal    Goals:  1.  Consistency with weekly gym routine.         Nutrition Education  The following education was provided to the patient:  Discussed Heart  Healthy Eating.  Suggested dietary modifications based on current dietary behaviors and individual food preferences.  *Lab results were pending at time of consult and so, not discussed with patient.    Patient verbalized understanding of nutrition education and recommendations received.    Handouts Provided  none    Monitoring/Evaluation    Monitor the following:  Weight  BMI  Caloric intake  Labs:  lipid panel, A1c    Follow Up Plan:  Communication with referring healthcare provider is unnecessary at this time as patient presented as part of annual wellness exam.  However, will follow up with patient in 1-2 years.

## 2022-12-07 NOTE — PROGRESS NOTES
"Subjective:      Patient ID: Kael Estrella III is a 57 y.o. male.    Chief Complaint: Executive Health    HPI      It was a pleasure to see you for your Executive Health  Physical on 12/7/22.   You are a 57-year-old  gentleman with a past medical history of hypertension.     Your current medications include lisinopril.  You have  no known drug allergies.     Your past surgical history includes hydrocele and oral  surgery.  You have never been a smoker.  You rarely  drink alcohol.     Your family history includes a mother with a history of  multiple sclerosis.  Your father has a pacemaker.  Your  siblings are healthy.      Preventative healthcare:  You reported that you are up-to-date with colon cancer  screening via Cologuard done 2 years.  You reported that  your tetanus vaccine is up-to-date.  You declined your  annual flu vaccine. Remember to check with your pharmacy regarding the new  shingles vaccine. Declined covid vaccines.     Review of Systems   Constitutional:  Negative for chills and fever.   HENT:  Negative for ear pain and sore throat.    Respiratory:  Negative for cough.    Cardiovascular:  Negative for chest pain.   Gastrointestinal:  Negative for abdominal pain and blood in stool.   Genitourinary:  Negative for dysuria and hematuria.   Neurological:  Negative for seizures and syncope.   Objective:   /71   Pulse 72   Temp 97 °F (36.1 °C)   Ht 5' 9" (1.753 m)   Wt 82 kg (180 lb 12.4 oz)   BMI 26.70 kg/m²     Physical Exam  Constitutional:       General: He is not in acute distress.     Appearance: He is well-developed.   HENT:      Head: Normocephalic and atraumatic.   Eyes:      Extraocular Movements: Extraocular movements intact.   Neck:      Thyroid: No thyromegaly.   Cardiovascular:      Rate and Rhythm: Normal rate and regular rhythm.   Pulmonary:      Breath sounds: Normal breath sounds. No wheezing or rales.   Abdominal:      General: Bowel sounds are normal.      Palpations: " Abdomen is soft.      Tenderness: There is no abdominal tenderness.   Musculoskeletal:         General: No swelling.      Cervical back: Neck supple. No rigidity.   Lymphadenopathy:      Cervical: No cervical adenopathy.   Skin:     General: Skin is warm and dry.   Neurological:      Mental Status: He is alert and oriented to person, place, and time.   Psychiatric:         Behavior: Behavior normal.       Lab Results   Component Value Date    WBC 7.59 12/07/2022    HGB 15.3 12/07/2022    HGB 14.8 01/05/2021    HGB 15.6 11/26/2019    HCT 43.0 12/07/2022    MCV 93 12/07/2022    MCV 94 01/05/2021    MCV 93 11/26/2019     12/07/2022    CHOL 185 12/07/2022    TRIG 80 12/07/2022    HDL 38 (L) 12/07/2022    LDLCALC 131.0 12/07/2022    LDLCALC 135.0 01/05/2021    LDLCALC 144.2 11/26/2019    ALT 19 12/07/2022    AST 19 12/07/2022     12/07/2022    K 4.5 12/07/2022     12/07/2022    CO2 25 12/07/2022    BUN 18 12/07/2022    CREATININE 1.4 12/07/2022    CREATININE 1.4 01/05/2021    CREATININE 1.3 11/26/2019    EGFRNORACEVR 58 (A) 12/07/2022    TSH 1.822 01/05/2021    TSH 3.424 11/26/2019    PSA 1.9 01/05/2021    PSA 0.70 11/26/2019    GLU 86 12/07/2022    HGBA1C 5.3 01/05/2021    HGBA1C 5.2 11/26/2019          The 10-year ASCVD risk score (Priya HE, et al., 2019) is: 7.2%    Values used to calculate the score:      Age: 57 years      Sex: Male      Is Non- : No      Diabetic: No      Tobacco smoker: No      Systolic Blood Pressure: 103 mmHg      Is BP treated: Yes      HDL Cholesterol: 38 mg/dL      Total Cholesterol: 185 mg/dL     Assessment:     1. Routine general medical examination at a health care facility    2. Essential hypertension      Plan:   1. Routine general medical examination at a health care facility    2. Essential hypertension    Heart healthy diet, regular exercise, and regular use of sunscreen.   HM reviewed  See exec health letter for details.     There are no  Patient Instructions on file for this visit.    No future appointments.        No follow-ups on file.

## 2022-12-07 NOTE — PROGRESS NOTES
Executive Health Screening    Kael Estrella III was seen 12/07/2022 for an executive health hearing screen.      Otoscopy was unremarkable. Results revealed normal hearing sensitivity through 750 Hz sloping to a moderately-severe hearing loss 250-8000 Hz for the right ear, and normal hearing sensitivity through 1500 Hz sloping to a moderate hearing loss for the left ear.     Patient was counseled on the above findings.    Recommendations:  Consider a comprehensive audiological evaluation.   Repeat hearing screening in one year.   Continue to utilize hearing protection when exposed to excessive noise.

## 2022-12-09 LAB
LEAD BLD-MCNC: <1 MCG/DL
SPECIMEN SOURCE: NORMAL
STATE OF RESIDENCE: NORMAL

## 2022-12-19 NOTE — LETTER
"December 19, 2022    Kael Estrella III  [address] [place] [address]             [address]  [address] [facility]  [place] [address]  Phone: [phone]  Fax: [phone] Dear Mr. Estrella:    It was a pleasure to see you for your Executive Health  Physical on 12/7/22.   You are a 57-year-old  gentleman with a past medical history of hypertension.     Your current medications include lisinopril.  You have  no known drug allergies.     Your past surgical history includes hydrocele and oral  surgery.  You have never been a smoker.  You rarely  drink alcohol.     Your family history includes a mother with a history of  multiple sclerosis.  Your father has a pacemaker.  Your  siblings are healthy.      Preventative healthcare:  You reported that you are up-to-date with colon cancer  screening via Cologuard done 2 years.  You reported that  your tetanus vaccine is up-to-date.  You declined your  annual flu vaccine. Remember to check with your pharmacy regarding the new  shingles vaccine. Declined covid vaccines.      Review of Systems   Constitutional:  Negative for chills and fever.   HENT:  Negative for ear pain and sore throat.    Respiratory:  Negative for cough.    Cardiovascular:  Negative for chest pain.   Gastrointestinal:  Negative for abdominal pain and blood in stool.   Genitourinary:  Negative for dysuria and hematuria.   Neurological:  Negative for seizures and syncope.     Vital Signs:   /71   Pulse 72   Temp 97 °F (36.1 °C)   Ht 5' 9" (1.753 m)   Wt 82 kg (180 lb 12.4 oz)   BMI 26.70 kg/m²     Your physical exam was normal.     Your lab results were all within acceptable ranges.     Results revealed normal hearing sensitivity through 750 Hz sloping to a moderately-severe hearing loss 250-8000 Hz for the right ear, and normal hearing sensitivity through 1500 Hz sloping to a moderate hearing loss for the left ear.   Recommendations:  1. Consider a " comprehensive audiological evaluation.   2. Repeat hearing screening in one year.   3. Continue to utilize hearing protection when exposed to excessive noise.        Again I would like to thank you for allowing me to participate in your executive health physical.  At this time it appears that you are in good overall health.  It is recommended that you maintain a heart healthy diet, regular exercise, and regular use of sunscreen along with regular checkups.      Please remember to discuss your hearing loss with your primary care doctor or ENT.    Please do not hesitate to contact me if you have any questions or concerns or if I can be of further assistance.       Sincerely,      Calvin Chisholm MD

## 2022-12-20 NOTE — PROGRESS NOTES
Subjective:      Patient ID: Kael Estrella III is a 57 y.o. male.    Chief Complaint: No chief complaint on file.    HPI  Review of Systems  Objective:   There were no vitals taken for this visit.    Physical Exam    Lab Results   Component Value Date    WBC 7.59 12/07/2022    HGB 15.3 12/07/2022    HGB 14.8 01/05/2021    HGB 15.6 11/26/2019    HCT 43.0 12/07/2022    MCV 93 12/07/2022    MCV 94 01/05/2021    MCV 93 11/26/2019     12/07/2022    CHOL 185 12/07/2022    TRIG 80 12/07/2022    HDL 38 (L) 12/07/2022    LDLCALC 131.0 12/07/2022    LDLCALC 135.0 01/05/2021    LDLCALC 144.2 11/26/2019    ALT 19 12/07/2022    AST 19 12/07/2022     12/07/2022    K 4.5 12/07/2022     12/07/2022    CO2 25 12/07/2022    BUN 18 12/07/2022    CREATININE 1.4 12/07/2022    CREATININE 1.4 01/05/2021    CREATININE 1.3 11/26/2019    EGFRNORACEVR 58 (A) 12/07/2022    TSH 2.657 12/07/2022    TSH 1.822 01/05/2021    TSH 3.424 11/26/2019    PSA 0.96 12/07/2022    PSA 1.9 01/05/2021    PSA 0.70 11/26/2019    GLU 86 12/07/2022    HGBA1C 4.9 12/07/2022    HGBA1C 5.3 01/05/2021    HGBA1C 5.2 11/26/2019          The 10-year ASCVD risk score (Priya HE, et al., 2019) is: 7.2%    Values used to calculate the score:      Age: 57 years      Sex: Male      Is Non- : No      Diabetic: No      Tobacco smoker: No      Systolic Blood Pressure: 103 mmHg      Is BP treated: Yes      HDL Cholesterol: 38 mg/dL      Total Cholesterol: 185 mg/dL     Assessment:     No diagnosis found.  Plan:       There are no Patient Instructions on file for this visit.    No future appointments.        No follow-ups on file.

## 2023-11-14 DIAGNOSIS — Z00.00 ROUTINE GENERAL MEDICAL EXAMINATION AT A HEALTH CARE FACILITY: Primary | ICD-10-CM

## 2023-12-01 ENCOUNTER — CLINICAL SUPPORT (OUTPATIENT)
Dept: AUDIOLOGY | Facility: CLINIC | Age: 58
End: 2023-12-01

## 2023-12-01 ENCOUNTER — OFFICE VISIT (OUTPATIENT)
Dept: INTERNAL MEDICINE | Facility: CLINIC | Age: 58
End: 2023-12-01

## 2023-12-01 ENCOUNTER — CLINICAL SUPPORT (OUTPATIENT)
Dept: PULMONOLOGY | Facility: CLINIC | Age: 58
End: 2023-12-01

## 2023-12-01 ENCOUNTER — CLINICAL SUPPORT (OUTPATIENT)
Dept: INTERNAL MEDICINE | Facility: CLINIC | Age: 58
End: 2023-12-01

## 2023-12-01 ENCOUNTER — CLINICAL SUPPORT (OUTPATIENT)
Dept: INTERNAL MEDICINE | Facility: CLINIC | Age: 58
End: 2023-12-01
Payer: COMMERCIAL

## 2023-12-01 ENCOUNTER — HOSPITAL ENCOUNTER (OUTPATIENT)
Dept: CARDIOLOGY | Facility: HOSPITAL | Age: 58
Discharge: HOME | End: 2023-12-01
Attending: INTERNAL MEDICINE

## 2023-12-01 VITALS
TEMPERATURE: 98 F | SYSTOLIC BLOOD PRESSURE: 118 MMHG | WEIGHT: 191 LBS | HEIGHT: 69 IN | HEART RATE: 74 BPM | BODY MASS INDEX: 28.29 KG/M2 | DIASTOLIC BLOOD PRESSURE: 83 MMHG

## 2023-12-01 DIAGNOSIS — Z91.89 INTERMEDIATE RISK FOR CORONARY ARTERY DISEASE: ICD-10-CM

## 2023-12-01 DIAGNOSIS — Z01.12 HEARING CONSERVATION AND TREATMENT EXAM: Primary | ICD-10-CM

## 2023-12-01 DIAGNOSIS — Z00.00 ROUTINE GENERAL MEDICAL EXAMINATION AT A HEALTH CARE FACILITY: ICD-10-CM

## 2023-12-01 DIAGNOSIS — Z00.00 ROUTINE GENERAL MEDICAL EXAMINATION AT A HEALTH CARE FACILITY: Primary | ICD-10-CM

## 2023-12-01 DIAGNOSIS — R94.31 ABNORMAL EKG: ICD-10-CM

## 2023-12-01 DIAGNOSIS — Z71.3 DIETARY COUNSELING: Primary | ICD-10-CM

## 2023-12-01 LAB
ALBUMIN SERPL BCP-MCNC: 4 G/DL (ref 3.5–5.2)
ALP SERPL-CCNC: 66 U/L (ref 55–135)
ALT SERPL W/O P-5'-P-CCNC: 26 U/L (ref 10–44)
ANION GAP SERPL CALC-SCNC: 8 MMOL/L (ref 8–16)
AST SERPL-CCNC: 27 U/L (ref 10–40)
BILIRUB SERPL-MCNC: 0.6 MG/DL (ref 0.1–1)
BILIRUB UR QL STRIP: NEGATIVE
BRPFT: NORMAL
BUN SERPL-MCNC: 17 MG/DL (ref 6–20)
CALCIUM SERPL-MCNC: 9.6 MG/DL (ref 8.7–10.5)
CHLORIDE SERPL-SCNC: 107 MMOL/L (ref 95–110)
CHOLEST SERPL-MCNC: 199 MG/DL (ref 120–199)
CHOLEST/HDLC SERPL: 5.2 {RATIO} (ref 2–5)
CLARITY UR: CLEAR
CO2 SERPL-SCNC: 24 MMOL/L (ref 23–29)
COLOR UR: YELLOW
COMPLEXED PSA SERPL-MCNC: 0.79 NG/ML (ref 0–4)
CREAT SERPL-MCNC: 1.5 MG/DL (ref 0.5–1.4)
ERYTHROCYTE [DISTWIDTH] IN BLOOD BY AUTOMATED COUNT: 11.7 % (ref 11.5–14.5)
EST. GFR  (NO RACE VARIABLE): 53 ML/MIN/1.73 M^2
ESTIMATED AVG GLUCOSE: 94 MG/DL (ref 68–131)
FEF 25 75 LLN: 1.41
FEF 25 75 PRE REF: 81.9 %
FEF 25 75 REF: 2.88
FEV1 FVC LLN: 66
FEV1 FVC PRE REF: 96.5 %
FEV1 FVC REF: 78
FEV1 LLN: 2.6
FEV1 PRE REF: 86.2 %
FEV1 REF: 3.45
FVC LLN: 3.39
FVC PRE REF: 89.1 %
FVC REF: 4.45
GLUCOSE SERPL-MCNC: 91 MG/DL (ref 70–110)
GLUCOSE UR QL STRIP: NEGATIVE
HBA1C MFR BLD: 4.9 % (ref 4–5.6)
HCT VFR BLD AUTO: 43.7 % (ref 40–54)
HDLC SERPL-MCNC: 38 MG/DL (ref 40–75)
HDLC SERPL: 19.1 % (ref 20–50)
HGB BLD-MCNC: 15.8 G/DL (ref 14–18)
HGB UR QL STRIP: NEGATIVE
KETONES UR QL STRIP: NEGATIVE
LDLC SERPL CALC-MCNC: 137.8 MG/DL (ref 63–159)
LEUKOCYTE ESTERASE UR QL STRIP: NEGATIVE
MCH RBC QN AUTO: 33.1 PG (ref 27–31)
MCHC RBC AUTO-ENTMCNC: 36.2 G/DL (ref 32–36)
MCV RBC AUTO: 92 FL (ref 82–98)
NITRITE UR QL STRIP: NEGATIVE
NONHDLC SERPL-MCNC: 161 MG/DL
PEF LLN: 6.73
PEF PRE REF: 75.5 %
PEF REF: 8.98
PH UR STRIP: 7 [PH] (ref 5–8)
PLATELET # BLD AUTO: 209 K/UL (ref 150–450)
PMV BLD AUTO: 9.6 FL (ref 9.2–12.9)
POTASSIUM SERPL-SCNC: 4 MMOL/L (ref 3.5–5.1)
PRE FEF 25 75: 2.36 L/S (ref 1.41–4.36)
PRE FET 100: 10.6 SEC
PRE FEV1 FVC: 74.91 % (ref 65.52–89.7)
PRE FEV1: 2.97 L (ref 2.6–4.3)
PRE FVC: 3.96 L (ref 3.39–5.51)
PRE PEF: 6.78 L/S (ref 6.73–11.22)
PROT SERPL-MCNC: 7.6 G/DL (ref 6–8.4)
PROT UR QL STRIP: NEGATIVE
RBC # BLD AUTO: 4.77 M/UL (ref 4.6–6.2)
SODIUM SERPL-SCNC: 139 MMOL/L (ref 136–145)
SP GR UR STRIP: 1.01 (ref 1–1.03)
TRIGL SERPL-MCNC: 116 MG/DL (ref 30–150)
TSH SERPL DL<=0.005 MIU/L-ACNC: 2.77 UIU/ML (ref 0.4–4)
URN SPEC COLLECT METH UR: NORMAL
WBC # BLD AUTO: 6.5 K/UL (ref 3.9–12.7)

## 2023-12-01 PROCEDURE — 99999 PR PBB SHADOW E&M-EST. PATIENT-LVL III: CPT | Mod: PBBFAC,,, | Performed by: INTERNAL MEDICINE

## 2023-12-01 PROCEDURE — 84153 ASSAY OF PSA TOTAL: CPT | Performed by: INTERNAL MEDICINE

## 2023-12-01 PROCEDURE — 80053 COMPREHEN METABOLIC PANEL: CPT | Performed by: INTERNAL MEDICINE

## 2023-12-01 PROCEDURE — 81003 URINALYSIS AUTO W/O SCOPE: CPT | Performed by: INTERNAL MEDICINE

## 2023-12-01 PROCEDURE — 80061 LIPID PANEL: CPT | Performed by: INTERNAL MEDICINE

## 2023-12-01 PROCEDURE — 93010 ELECTROCARDIOGRAM REPORT: CPT | Mod: ,,, | Performed by: INTERNAL MEDICINE

## 2023-12-01 PROCEDURE — 94010 BREATHING CAPACITY TEST: ICD-10-PCS | Mod: S$GLB,,, | Performed by: INTERNAL MEDICINE

## 2023-12-01 PROCEDURE — 85027 COMPLETE CBC AUTOMATED: CPT | Performed by: INTERNAL MEDICINE

## 2023-12-01 PROCEDURE — 99999 PR PBB SHADOW E&M-EST. PATIENT-LVL I: ICD-10-PCS | Mod: PBBFAC,,,

## 2023-12-01 PROCEDURE — 83655 ASSAY OF LEAD: CPT | Performed by: INTERNAL MEDICINE

## 2023-12-01 PROCEDURE — 83036 HEMOGLOBIN GLYCOSYLATED A1C: CPT | Performed by: INTERNAL MEDICINE

## 2023-12-01 PROCEDURE — 97802 PR MED NUTR THER, 1ST, INDIV, EA 15 MIN: ICD-10-PCS | Mod: S$GLB,,, | Performed by: DIETITIAN, REGISTERED

## 2023-12-01 PROCEDURE — 99999 PR PBB SHADOW E&M-EST. PATIENT-LVL III: ICD-10-PCS | Mod: PBBFAC,,, | Performed by: INTERNAL MEDICINE

## 2023-12-01 PROCEDURE — 97802 MEDICAL NUTRITION INDIV IN: CPT | Mod: S$GLB,,, | Performed by: DIETITIAN, REGISTERED

## 2023-12-01 PROCEDURE — 94010 BREATHING CAPACITY TEST: CPT | Mod: S$GLB,,, | Performed by: INTERNAL MEDICINE

## 2023-12-01 PROCEDURE — 92552 PURE TONE AUDIOMETRY AIR: CPT | Mod: S$GLB,,,

## 2023-12-01 PROCEDURE — 93010 EKG 12-LEAD: ICD-10-PCS | Mod: ,,, | Performed by: INTERNAL MEDICINE

## 2023-12-01 PROCEDURE — 84443 ASSAY THYROID STIM HORMONE: CPT | Performed by: INTERNAL MEDICINE

## 2023-12-01 PROCEDURE — 99211 OFF/OP EST MAY X REQ PHY/QHP: CPT | Mod: S$GLB,,, | Performed by: INTERNAL MEDICINE

## 2023-12-01 PROCEDURE — 99396 PR PREVENTIVE VISIT,EST,40-64: ICD-10-PCS | Mod: S$GLB,,, | Performed by: INTERNAL MEDICINE

## 2023-12-01 PROCEDURE — 92552 PR PURE TONE AUDIOMETRY, AIR: ICD-10-PCS | Mod: S$GLB,,,

## 2023-12-01 PROCEDURE — 99396 PREV VISIT EST AGE 40-64: CPT | Mod: S$GLB,,, | Performed by: INTERNAL MEDICINE

## 2023-12-01 PROCEDURE — 99999 PR PBB SHADOW E&M-EST. PATIENT-LVL I: CPT | Mod: PBBFAC,,,

## 2023-12-01 PROCEDURE — 99211 PR NURSE VISIT, 15 MINS, EXEC HLTH ONLY: ICD-10-PCS | Mod: S$GLB,,, | Performed by: INTERNAL MEDICINE

## 2023-12-01 PROCEDURE — 93005 ELECTROCARDIOGRAM TRACING: CPT

## 2023-12-01 NOTE — PROGRESS NOTES
"Nutrition Assessment  Session Time:  30 minutes      Client name:  Kael Estrella III  :  1965  Age:  58 y.o.  Gender:  male    Client states:  [employer] employee present for annual physical.  Last physical and nutrition consult: 2022    Reports average weight is 185 lbs.  Satisfied with current diet + exercise routine.   Sodas are consumed 3x/week at most.   Does state that water intake needs to be improved.  Recently has increased his exercise. Current routine is 3-4x/week.    No questions/concerns today.         Anthropometrics  Height:  69"     Weight:  191 lbs  BMI:  28.21  % Body Fat:  n/a    Clinical Signs/Symptoms  N/V/D:  none noted  Appetite:  good       Past Medical History:   Diagnosis Date    COVID-19 2020    Essential hypertension 2021       No past surgical history on file.    Medications    has a current medication list which includes the following prescription(s): lisinopril.    Vitamins, Minerals, and/or Supplements:  not discussed     Food/Medication Interactions:  Reviewed     Food Allergies or Intolerances:  NKFA noted in chart     Social History    Marital status:    Employment:  [employer]    Social History     Tobacco Use    Smoking status: Never    Smokeless tobacco: Never   Substance Use Topics    Alcohol use: No        Lab Reports   Sodium   Date Value Ref Range Status   2022 140 136 - 145 mmol/L Final     Potassium   Date Value Ref Range Status   2022 4.5 3.5 - 5.1 mmol/L Final     Chloride   Date Value Ref Range Status   2022 106 95 - 110 mmol/L Final     CO2   Date Value Ref Range Status   2022 25 23 - 29 mmol/L Final     Glucose   Date Value Ref Range Status   2022 86 70 - 110 mg/dL Final     BUN   Date Value Ref Range Status   2022 18 6 - 20 mg/dL Final     Creatinine   Date Value Ref Range Status   2022 1.4 0.5 - 1.4 mg/dL Final     Calcium   Date Value Ref Range Status   2022 9.7 8.7 - 10.5 " mg/dL Final     Total Protein   Date Value Ref Range Status   12/07/2022 7.2 6.0 - 8.4 g/dL Final     Albumin   Date Value Ref Range Status   12/07/2022 4.0 3.5 - 5.2 g/dL Final     Total Bilirubin   Date Value Ref Range Status   12/07/2022 0.9 0.1 - 1.0 mg/dL Final     Comment:     For infants and newborns, interpretation of results should be based  on gestational age, weight and in agreement with clinical  observations.    Premature Infant recommended reference ranges:  Up to 24 hours.............<8.0 mg/dL  Up to 48 hours............<12.0 mg/dL  3-5 days..................<15.0 mg/dL  6-29 days.................<15.0 mg/dL       Alkaline Phosphatase   Date Value Ref Range Status   12/07/2022 59 55 - 135 U/L Final     AST   Date Value Ref Range Status   12/07/2022 19 10 - 40 U/L Final     ALT   Date Value Ref Range Status   12/07/2022 19 10 - 44 U/L Final     Anion Gap   Date Value Ref Range Status   12/07/2022 9 8 - 16 mmol/L Final     eGFR if    Date Value Ref Range Status   01/05/2021 >60 >60 mL/min/1.73 m^2 Final     eGFR if non    Date Value Ref Range Status   01/05/2021 55 (A) >60 mL/min/1.73 m^2 Final     Comment:     Calculation used to obtain the estimated glomerular filtration  rate (eGFR) is the CKD-EPI equation.         Lab Results   Component Value Date    WBC 7.59 12/07/2022    HGB 15.3 12/07/2022    HCT 43.0 12/07/2022    MCV 93 12/07/2022     12/07/2022        Lab Results   Component Value Date    CHOL 185 12/07/2022     Lab Results   Component Value Date    HDL 38 (L) 12/07/2022     Lab Results   Component Value Date    LDLCALC 131.0 12/07/2022     Lab Results   Component Value Date    TRIG 80 12/07/2022     Lab Results   Component Value Date    CHOLHDL 20.5 12/07/2022     Lab Results   Component Value Date    HGBA1C 4.9 12/07/2022     BP Readings from Last 1 Encounters:   12/07/22 103/71       Food History  No recall.   Some information provided  above    Exercise History:  3-4x/week    Cultural/Spiritual/Personal Preferences:  None identified    Support System:  family/friends    State of Change:  Precontemplation    Barriers to Change:  none    Diagnosis    No nutrition diagnosis at this time.    Intervention    RMR (Method:  [place] [facility]):  1672 kcal  Activity Factor:  1.3    AYLA:  2173 kcal    Goals:  1.  Increase weekly cardio for HDL improvement       Nutrition Education  The following education was provided to the patient:  Discussed Heart Healthy Eating.  Suggested dietary modifications based on current dietary behaviors and individual food preferences.  Discussed nutrition-related lab values and dietary and/or lifestyle factors affecting them.    Patient verbalized understanding of nutrition education and recommendations received.    Handouts Provided  none    Monitoring/Evaluation    Monitor the following:  Weight  BMI  Caloric intake  Labs:  lipid panel, glucose, HgbA1c    Follow Up Plan:  Communication with referring healthcare provider is unnecessary at this time as patient presented as part of annual wellness exam.  However, will follow up with patient in 1-2 years.

## 2023-12-01 NOTE — PROGRESS NOTES
"Subjective:      Patient ID: Kael Estrella III is a 58 y.o. male.    Chief Complaint: Executive Health    HPI    It was a pleasure to see you for your Executive Health  Physical on 12/1/23.   You are a 58-year-old  gentleman with a past medical history of hypertension.     Your current medications include lisinopril.  You have  no known drug allergies.     Your past surgical history includes hydrocele and oral  surgery.  You have never been a smoker.  You rarely  drink alcohol.     Your family history includes a mother with a history of  multiple sclerosis.  Your father has a pacemaker.  Your  siblings are healthy. Denies f/h of cad.      Preventative healthcare:  You reported that you are up-to-date with colon cancer  screening via Cologuard done 3 years. Please check with your primary care doctor for due date of next screening  You reported that  your tetanus vaccine is up-to-date.  You declined your  annual flu vaccine. Remember to check with your pharmacy regarding the new  shingles vaccine. Declined covid vaccines.     Review of Systems   Constitutional:  Negative for chills and fever.   HENT:  Negative for ear pain and sore throat.    Respiratory:  Negative for cough.    Cardiovascular:  Negative for chest pain.   Gastrointestinal:  Negative for abdominal pain and blood in stool.   Genitourinary:  Negative for dysuria and hematuria.   Neurological:  Negative for seizures and syncope.     Objective:   /83   Pulse 74   Temp 97.5 °F (36.4 °C)   Ht 5' 9" (1.753 m)   Wt 86.6 kg (191 lb)   BMI 28.21 kg/m²     Physical Exam  Constitutional:       General: He is not in acute distress.     Appearance: He is well-developed.   Cardiovascular:      Rate and Rhythm: Normal rate.   Pulmonary:      Effort: Pulmonary effort is normal.      Breath sounds: Normal breath sounds.   Skin:     General: Skin is warm and dry.   Psychiatric:         Behavior: Behavior normal.         Lab Results   Component Value Date    " WBC 6.50 12/01/2023    HGB 15.8 12/01/2023    HGB 15.3 12/07/2022    HGB 14.8 01/05/2021    HCT 43.7 12/01/2023    MCV 92 12/01/2023    MCV 93 12/07/2022    MCV 94 01/05/2021     12/01/2023    CHOL 199 12/01/2023    TRIG 116 12/01/2023    HDL 38 (L) 12/01/2023    LDLCALC 137.8 12/01/2023    LDLCALC 131.0 12/07/2022    LDLCALC 135.0 01/05/2021    ALT 26 12/01/2023    AST 27 12/01/2023     12/01/2023    K 4.0 12/01/2023    CALCIUM 9.6 12/01/2023     12/01/2023    CO2 24 12/01/2023    BUN 17 12/01/2023    CREATININE 1.5 (H) 12/01/2023    CREATININE 1.4 12/07/2022    CREATININE 1.4 01/05/2021    EGFRNORACEVR 53 (A) 12/01/2023    EGFRNORACEVR 58 (A) 12/07/2022    TSH 2.771 12/01/2023    TSH 2.657 12/07/2022    TSH 1.822 01/05/2021    PSA 0.79 12/01/2023    PSA 0.96 12/07/2022    PSA 1.9 01/05/2021    GLU 91 12/01/2023    HGBA1C 4.9 12/01/2023    HGBA1C 4.9 12/07/2022    HGBA1C 5.3 01/05/2021            The 10-year ASCVD risk score (Priya DK, et al., 2019) is: 10.6%    Values used to calculate the score:      Age: 58 years      Sex: Male      Is Non- : No      Diabetic: No      Tobacco smoker: No      Systolic Blood Pressure: 118 mmHg      Is BP treated: Yes      HDL Cholesterol: 38 mg/dL      Total Cholesterol: 199 mg/dL       Spirometry normal.     Assessment:     1. Routine general medical examination at a health care facility    2. Intermediate risk for coronary artery disease    3. Abnormal EKG      Plan:   1. Routine general medical examination at a health care facility    2. Intermediate risk for coronary artery disease  Overview:  Reports cor calcium normal in 2023.       3. Abnormal EKG  -     Ambulatory referral/consult to Cardiology; Future; Expected date: 12/08/2023      Heart healthy diet, regular exercise, and regular use of sunscreen.   HM reviewed  See exec health letter for details.   There are no Patient Instructions on file for this visit.    No future  appointments.          No follow-ups on file.

## 2023-12-01 NOTE — PROGRESS NOTES
Executive Health Screening    Kael Estrella III was seen 12/01/2023 for an [facility] hearing screen.      Otoscopy was unremarkable in both ears. Results revealed normal hearing sensitivity through 1000 Hz sloping to a mild to moderately-severe hearing loss for the right ear, and normal hearing sensitivity through 1500 Hz sloping to a mild to moderately-severe hearing loss for the left ear.     Patient was counseled on the above findings.    Recommendations:   Complete audiological evaluation with ENT consult.  Possible hearing aid consult pending candidacy.   Consistent use of hearing protective devices when around loud noise.   Annual hearing screenings.

## 2023-12-04 LAB
CITY: NORMAL
COUNTY: NORMAL
GUARDIAN FIRST NAME: NORMAL
GUARDIAN LAST NAME: NORMAL
LEAD BLD-MCNC: 1.9 MCG/DL
PHONE #: NORMAL
POSTAL CODE: NORMAL
RACE: NORMAL
STATE OF RESIDENCE: NORMAL
STREET ADDRESS: NORMAL

## 2024-01-17 ENCOUNTER — TELEPHONE (OUTPATIENT)
Dept: RESEARCH | Facility: HOSPITAL | Age: 59
End: 2024-01-17
Payer: COMMERCIAL

## 2024-11-18 DIAGNOSIS — Z00.00 ROUTINE GENERAL MEDICAL EXAMINATION AT A HEALTH CARE FACILITY: Primary | ICD-10-CM

## 2024-11-19 ENCOUNTER — PATIENT MESSAGE (OUTPATIENT)
Dept: NEUROLOGY | Facility: CLINIC | Age: 59
End: 2024-11-19
Payer: COMMERCIAL

## 2024-12-05 NOTE — PROGRESS NOTES
"Subjective:      Patient ID: Kael Estrella III is a 59 y.o. male.    Chief Complaint: Executive Health      HPI  Patient is here for Executive physical with [facility].  Active at work.  No regular exercise.  Energy good.  No f/c/sw/cough.  No cp/sob/palp.  BMs normal.  Urine normal.          Past Medical History:   Diagnosis Date    COVID-19 12/07/2020    Essential hypertension 1/5/2021     History reviewed. No pertinent surgical history.    MEDS:  lisinopril 10 MG tablet 5 mg, Daily       NKDA    SH:  no tob, occas EtOH, .    FH:  no cancers.    HM:  2016 Tet, 12/24 PSA, Cscope is utd, 2022 CT Ca score zero.      Review of Systems   Constitutional:  Negative for appetite change, chills, diaphoresis, fatigue and fever.   HENT:  Negative for congestion, ear pain, rhinorrhea and sinus pressure.    Respiratory:  Negative for cough and shortness of breath.    Cardiovascular:  Negative for chest pain and palpitations.   Gastrointestinal:  Negative for abdominal distention, abdominal pain, blood in stool, constipation, diarrhea, nausea and vomiting.   Genitourinary:  Negative for difficulty urinating, dysuria, frequency, hematuria and urgency.   Musculoskeletal:  Negative for arthralgias.   Skin:  Negative for rash.   Neurological:  Negative for dizziness and headaches.   Psychiatric/Behavioral:  The patient is not nervous/anxious.          Objective:   /82 (BP Location: Right arm, Patient Position: Sitting)   Pulse 75   Temp 97.6 °F (36.4 °C) (Oral)   Ht 5' 9" (1.753 m)   Wt 89.8 kg (198 lb)   BMI 29.24 kg/m²     Physical Exam  Constitutional:       Appearance: He is well-developed.   HENT:      Right Ear: External ear normal. Tympanic membrane is not injected.      Left Ear: External ear normal. Tympanic membrane is not injected.   Eyes:      Conjunctiva/sclera: Conjunctivae normal.   Neck:      Thyroid: No thyromegaly.   Cardiovascular:      Rate and Rhythm: Normal rate and regular rhythm.     "  Heart sounds: No murmur heard.     No friction rub. No gallop.   Pulmonary:      Effort: Pulmonary effort is normal.      Breath sounds: Normal breath sounds. No wheezing or rales.   Abdominal:      General: Bowel sounds are normal.      Palpations: Abdomen is soft. There is no mass.      Tenderness: There is no abdominal tenderness.   Musculoskeletal:      Cervical back: Normal range of motion and neck supple.   Lymphadenopathy:      Cervical: No cervical adenopathy.   Neurological:      Mental Status: He is alert and oriented to person, place, and time.         ETT: negative.      Assessment:       1. Encounter for preventive health examination    2. Essential hypertension          Plan:     1. Encounter for preventive health examination- Report results when available.  HAV at pharm.    2. Essential hypertension- stable, cont rx.

## 2024-12-17 NOTE — PROGRESS NOTES
"Nutrition Assessment  Session Time:  30 minutes      Client name:  Kael Estrella III  :  1965  Age:  59 y.o.  Gender:  male    Client states:  Present for nutrition counseling as part of his annual Executive Health physical.  Last seen: 2023    Reports mostly same diet and exercise routine as last year.  Has worked to increase his water intake. Interested to see how labs have changed.    No other concerns today.       Anthropometrics  Height:  69"     Weight:  198 lbs  BMI:  29.24  % Body Fat:  n/a    Clinical Signs/Symptoms  N/V/D:  none noted  Appetite:  good       Past Medical History:   Diagnosis Date    COVID-19 2020    Essential hypertension 2021       No past surgical history on file.    Medications    has a current medication list which includes the following prescription(s): lisinopril.    Vitamins, Minerals, and/or Supplements:  not discussed     Food/Medication Interactions:  Reviewed     Food Allergies or Intolerances:  NKFA noted in chart     Social History    Marital status:    Employment:  yes    Social History     Tobacco Use    Smoking status: Never    Smokeless tobacco: Never   Substance Use Topics    Alcohol use: No        Lab Reports   Sodium   Date Value Ref Range Status   2023 139 136 - 145 mmol/L Final     Potassium   Date Value Ref Range Status   2023 4.0 3.5 - 5.1 mmol/L Final     Chloride   Date Value Ref Range Status   2023 107 95 - 110 mmol/L Final     CO2   Date Value Ref Range Status   2023 24 23 - 29 mmol/L Final     Glucose   Date Value Ref Range Status   2023 91 70 - 110 mg/dL Final     BUN   Date Value Ref Range Status   2023 17 6 - 20 mg/dL Final     Creatinine   Date Value Ref Range Status   2023 1.5 (H) 0.5 - 1.4 mg/dL Final     Calcium   Date Value Ref Range Status   2023 9.6 8.7 - 10.5 mg/dL Final     Total Protein   Date Value Ref Range Status   2023 7.6 6.0 - 8.4 g/dL Final     Albumin "   Date Value Ref Range Status   12/01/2023 4.0 3.5 - 5.2 g/dL Final     Total Bilirubin   Date Value Ref Range Status   12/01/2023 0.6 0.1 - 1.0 mg/dL Final     Comment:     For infants and newborns, interpretation of results should be based  on gestational age, weight and in agreement with clinical  observations.    Premature Infant recommended reference ranges:  Up to 24 hours.............<8.0 mg/dL  Up to 48 hours............<12.0 mg/dL  3-5 days..................<15.0 mg/dL  6-29 days.................<15.0 mg/dL       Alkaline Phosphatase   Date Value Ref Range Status   12/01/2023 66 55 - 135 U/L Final     AST   Date Value Ref Range Status   12/01/2023 27 10 - 40 U/L Final     ALT   Date Value Ref Range Status   12/01/2023 26 10 - 44 U/L Final     Anion Gap   Date Value Ref Range Status   12/01/2023 8 8 - 16 mmol/L Final     eGFR if    Date Value Ref Range Status   01/05/2021 >60 >60 mL/min/1.73 m^2 Final     eGFR if non    Date Value Ref Range Status   01/05/2021 55 (A) >60 mL/min/1.73 m^2 Final     Comment:     Calculation used to obtain the estimated glomerular filtration  rate (eGFR) is the CKD-EPI equation.         Lab Results   Component Value Date    WBC 6.50 12/01/2023    HGB 15.8 12/01/2023    HCT 43.7 12/01/2023    MCV 92 12/01/2023     12/01/2023        Lab Results   Component Value Date    CHOL 199 12/01/2023     Lab Results   Component Value Date    HDL 38 (L) 12/01/2023     Lab Results   Component Value Date    LDLCALC 137.8 12/01/2023     Lab Results   Component Value Date    TRIG 116 12/01/2023     Lab Results   Component Value Date    CHOLHDL 19.1 (L) 12/01/2023     Lab Results   Component Value Date    HGBA1C 4.9 12/01/2023     BP Readings from Last 1 Encounters:   12/01/23 118/83       Food History  reviewed    Exercise History:  reviewed    Cultural/Spiritual/Personal Preferences:  None identified    Support System:  family/friends    State of Change:   Contemplation    Barriers to Change:  none    Diagnosis    Altered nutrition related laboratory values related to suspected inadequate dietary fiber intake as evidenced by Triglycerides 153.    Intervention    RMR (Method:  [place] [facility]):  1699 kcal  Activity Factor:  1.3    AYLA:  2208 kcal    Goals:  1.  Increase daily fiber intake. Aim for 30 grams per day.      Nutrition Education  The following education was provided to the patient:  Discussed Heart Healthy Eating.  Suggested dietary modifications based on current dietary behaviors and individual food preferences.  Discussed nutrition-related lab values and dietary and/or lifestyle factors affecting them.    Patient verbalized understanding of nutrition education and recommendations received.    Handouts Provided  none    Monitoring/Evaluation    Monitor the following:  Weight  BMI  Caloric intake  Labs:  lipid panel, HgbA1c, CMP    Follow Up Plan:  Communication with referring healthcare provider is unnecessary at this time as patient presented as part of annual wellness exam.  However, will follow up with patient in 1-2 years.

## 2024-12-18 ENCOUNTER — OFFICE VISIT (OUTPATIENT)
Dept: INTERNAL MEDICINE | Facility: CLINIC | Age: 59
End: 2024-12-18

## 2024-12-18 ENCOUNTER — CLINICAL SUPPORT (OUTPATIENT)
Dept: INTERNAL MEDICINE | Facility: CLINIC | Age: 59
End: 2024-12-18
Payer: COMMERCIAL

## 2024-12-18 ENCOUNTER — CLINICAL SUPPORT (OUTPATIENT)
Dept: INTERNAL MEDICINE | Facility: CLINIC | Age: 59
End: 2024-12-18

## 2024-12-18 ENCOUNTER — HOSPITAL ENCOUNTER (OUTPATIENT)
Dept: CARDIOLOGY | Facility: HOSPITAL | Age: 59
Discharge: HOME | End: 2024-12-18
Attending: INTERNAL MEDICINE

## 2024-12-18 VITALS
WEIGHT: 198 LBS | DIASTOLIC BLOOD PRESSURE: 82 MMHG | SYSTOLIC BLOOD PRESSURE: 117 MMHG | HEART RATE: 75 BPM | BODY MASS INDEX: 29.33 KG/M2 | TEMPERATURE: 98 F | HEIGHT: 69 IN

## 2024-12-18 DIAGNOSIS — Z00.00 ENCOUNTER FOR PREVENTIVE HEALTH EXAMINATION: Primary | ICD-10-CM

## 2024-12-18 DIAGNOSIS — Z00.00 ROUTINE GENERAL MEDICAL EXAMINATION AT A HEALTH CARE FACILITY: Primary | ICD-10-CM

## 2024-12-18 DIAGNOSIS — Z00.00 ROUTINE GENERAL MEDICAL EXAMINATION AT A HEALTH CARE FACILITY: ICD-10-CM

## 2024-12-18 DIAGNOSIS — Z12.9 CANCER SCREENING: Primary | ICD-10-CM

## 2024-12-18 DIAGNOSIS — I10 ESSENTIAL HYPERTENSION: ICD-10-CM

## 2024-12-18 DIAGNOSIS — Z00.00 ENCOUNTER FOR PREVENTIVE HEALTH EXAMINATION: ICD-10-CM

## 2024-12-18 LAB
ALBUMIN SERPL BCP-MCNC: 3.9 G/DL (ref 3.5–5.2)
ALP SERPL-CCNC: 65 U/L (ref 40–150)
ALT SERPL W/O P-5'-P-CCNC: 24 U/L (ref 10–44)
ANION GAP SERPL CALC-SCNC: 9 MMOL/L (ref 8–16)
AST SERPL-CCNC: 23 U/L (ref 10–40)
BILIRUB SERPL-MCNC: 0.6 MG/DL (ref 0.1–1)
BILIRUB UR QL STRIP: NEGATIVE
BUN SERPL-MCNC: 16 MG/DL (ref 8–23)
CALCIUM SERPL-MCNC: 9.6 MG/DL (ref 8.7–10.5)
CHLORIDE SERPL-SCNC: 106 MMOL/L (ref 95–110)
CHOLEST SERPL-MCNC: 211 MG/DL (ref 120–199)
CHOLEST/HDLC SERPL: 5.6 {RATIO} (ref 2–5)
CLARITY UR: CLEAR
CO2 SERPL-SCNC: 23 MMOL/L (ref 23–29)
COLOR UR: YELLOW
COMPLEXED PSA SERPL-MCNC: 0.88 NG/ML (ref 0–4)
CREAT SERPL-MCNC: 1.3 MG/DL (ref 0.5–1.4)
CV STRESS BASE HR: 85 BPM
DIASTOLIC BLOOD PRESSURE: 82 MMHG
ERYTHROCYTE [DISTWIDTH] IN BLOOD BY AUTOMATED COUNT: 11.8 % (ref 11.5–14.5)
EST. GFR  (NO RACE VARIABLE): >60 ML/MIN/1.73 M^2
ESTIMATED AVG GLUCOSE: 94 MG/DL (ref 68–131)
GLUCOSE SERPL-MCNC: 80 MG/DL (ref 70–110)
GLUCOSE UR QL STRIP: NEGATIVE
HAV IGG SER QL IA: REACTIVE
HBA1C MFR BLD: 4.9 % (ref 4–5.6)
HCT VFR BLD AUTO: 45.5 % (ref 40–54)
HDLC SERPL-MCNC: 38 MG/DL (ref 40–75)
HDLC SERPL: 18 % (ref 20–50)
HGB BLD-MCNC: 16 G/DL (ref 14–18)
HGB UR QL STRIP: NEGATIVE
KETONES UR QL STRIP: NEGATIVE
LDLC SERPL CALC-MCNC: 142.4 MG/DL (ref 63–159)
LEUKOCYTE ESTERASE UR QL STRIP: NEGATIVE
MCH RBC QN AUTO: 32.7 PG (ref 27–31)
MCHC RBC AUTO-ENTMCNC: 35.2 G/DL (ref 32–36)
MCV RBC AUTO: 93 FL (ref 82–98)
NITRITE UR QL STRIP: NEGATIVE
NONHDLC SERPL-MCNC: 173 MG/DL
OHS CV CPX 85 PERCENT MAX PREDICTED HEART RATE MALE: 135
OHS CV CPX ESTIMATED METS: 10
OHS CV CPX MAX PREDICTED HEART RATE: 159
OHS CV CPX PATIENT IS FEMALE: 0
OHS CV CPX PATIENT IS MALE: 1
OHS CV CPX PEAK DIASTOLIC BLOOD PRESSURE: 92 MMHG
OHS CV CPX PEAK HEAR RATE: 155 BPM
OHS CV CPX PEAK RATE PRESSURE PRODUCT: NORMAL
OHS CV CPX PEAK SYSTOLIC BLOOD PRESSURE: 171 MMHG
OHS CV CPX PERCENT MAX PREDICTED HEART RATE ACHIEVED: 97
OHS CV CPX RATE PRESSURE PRODUCT PRESENTING: 9945
PH UR STRIP: 6 [PH] (ref 5–8)
PLATELET # BLD AUTO: 198 K/UL (ref 150–450)
PMV BLD AUTO: 10.2 FL (ref 9.2–12.9)
POTASSIUM SERPL-SCNC: 3.7 MMOL/L (ref 3.5–5.1)
PROT SERPL-MCNC: 7.5 G/DL (ref 6–8.4)
PROT UR QL STRIP: NEGATIVE
RBC # BLD AUTO: 4.89 M/UL (ref 4.6–6.2)
SODIUM SERPL-SCNC: 138 MMOL/L (ref 136–145)
SP GR UR STRIP: 1.02 (ref 1–1.03)
STRESS ECHO POST EXERCISE DUR MIN: 8 MINUTES
STRESS ECHO POST EXERCISE DUR SEC: 34 SECONDS
SYSTOLIC BLOOD PRESSURE: 117 MMHG
TRIGL SERPL-MCNC: 153 MG/DL (ref 30–150)
TSH SERPL DL<=0.005 MIU/L-ACNC: 3.53 UIU/ML (ref 0.4–4)
URN SPEC COLLECT METH UR: NORMAL
WBC # BLD AUTO: 5.85 K/UL (ref 3.9–12.7)

## 2024-12-18 PROCEDURE — 99999 PR PBB SHADOW E&M-EST. PATIENT-LVL III: CPT | Mod: PBBFAC,,, | Performed by: INTERNAL MEDICINE

## 2024-12-18 PROCEDURE — 93016 CV STRESS TEST SUPVJ ONLY: CPT | Mod: ,,, | Performed by: INTERNAL MEDICINE

## 2024-12-18 PROCEDURE — 80053 COMPREHEN METABOLIC PANEL: CPT | Performed by: INTERNAL MEDICINE

## 2024-12-18 PROCEDURE — 86790 VIRUS ANTIBODY NOS: CPT | Performed by: INTERNAL MEDICINE

## 2024-12-18 PROCEDURE — 93017 CV STRESS TEST TRACING ONLY: CPT

## 2024-12-18 PROCEDURE — 80061 LIPID PANEL: CPT | Performed by: INTERNAL MEDICINE

## 2024-12-18 PROCEDURE — 85027 COMPLETE CBC AUTOMATED: CPT | Performed by: INTERNAL MEDICINE

## 2024-12-18 PROCEDURE — 81479 UNLISTED MOLECULAR PATHOLOGY: CPT | Mod: WS | Performed by: INTERNAL MEDICINE

## 2024-12-18 PROCEDURE — 84443 ASSAY THYROID STIM HORMONE: CPT | Performed by: INTERNAL MEDICINE

## 2024-12-18 PROCEDURE — 93018 CV STRESS TEST I&R ONLY: CPT | Mod: ,,, | Performed by: INTERNAL MEDICINE

## 2024-12-18 PROCEDURE — 84153 ASSAY OF PSA TOTAL: CPT | Performed by: INTERNAL MEDICINE

## 2024-12-18 PROCEDURE — 83036 HEMOGLOBIN GLYCOSYLATED A1C: CPT | Performed by: INTERNAL MEDICINE

## 2024-12-18 PROCEDURE — 83655 ASSAY OF LEAD: CPT | Performed by: INTERNAL MEDICINE

## 2024-12-18 PROCEDURE — 81003 URINALYSIS AUTO W/O SCOPE: CPT | Performed by: INTERNAL MEDICINE

## 2024-12-18 NOTE — LETTER
"December 18, 2024    Kael Estrella III  [address] [place] [address]             [address]  [address] [facility]  [place] [address]  Phone: [phone]  Fax: [phone] Dear Mr. Estrella:    It was a pleasure to see you and perform your [facility] physical on Wednesday, December 18, 2024.  At the time of your visit you are 59 years old.  You are active in general, and report good energy level.  You have had no fevers, chills, sweats, or cough.  You deny any exertional chest pain, shortness of breath, or palpitations.  You report normal bowel and urinary function.      Your past history is significant for hypertension only.  Your medication includes lisinopril 5 mg daily.  You have no known medication allergies.  Your health maintenance includes 2016 tetanus vaccination, 12/2024 PSA, colonoscopy is up-to-date, 2022 CT calcium score 0.     On physical exam height is 5'9" , your weight is 198 pounds,  body mass index is 29.24.Your blood pressure 117/82,  pulse 75,  Your general appearance is well-developed,  with no distress. Your head and neck exam is normal.  Your heart has a regular rate and rhythm with no abnormal sounds.  Your lungs are clear throughout, with normal respiratory effort.  Your abdomen has normal bowel sounds with no masses or enlarged organs noted. Your extremities have strong distal pulses, with no swelling.     Your exercise treadmill stress test is negative/normal.  You will be very happy to hear that you are already immune to hepatitis-A virus, and show antibodies in your lab work.  Your blood count is normal, platelets 198 normal.  Your kidney and liver function are normal.  Your electrolytes are normal.  Your fasting glucose 80, normal.  Hemoglobin A1c 4.9%, normal.  TSH 3.5-7, normal.  PSA 0.88, normal.  Your urinalysis is clear.  Total cholesterol 211, HDL 38, .4, triglycerides 153.    The 10-year ASCVD risk score (Priya HE, et al., " 2019) is: 11.8%    Values used to calculate the score:      Age: 59 years      Sex: Male      Is Non- : No      Diabetic: No      Tobacco smoker: No      Systolic Blood Pressure: 117 mmHg      Is BP treated: Yes      HDL Cholesterol: 38 mg/dL      Total Cholesterol: 211 mg/dL    With this calculation, you would qualify for a statin medication, but your CT calcium score being 0 means you do not need to start medication at this time.    In summary you appear to be in very good general medical health.  You are up-to-date on all vaccinations and preventive care needed at this time.  Please work at staying active and including some regular exercise, to protect your arteries as you age.    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  If I can be of any further assistance, or if you have any questions or concerns please do not hesitate to let me know.    Yours very truly,      Jessica Bhat MD

## 2024-12-19 LAB
CITY: NORMAL
COUNTY: NORMAL
GUARDIAN FIRST NAME: NORMAL
GUARDIAN LAST NAME: NORMAL
LEAD BLD-MCNC: 1.2 MCG/DL
PHONE #: NORMAL
POSTAL CODE: NORMAL
RACE: NORMAL
STATE OF RESIDENCE: NORMAL
STREET ADDRESS: NORMAL